# Patient Record
Sex: MALE | Race: WHITE | Employment: UNEMPLOYED | URBAN - METROPOLITAN AREA
[De-identification: names, ages, dates, MRNs, and addresses within clinical notes are randomized per-mention and may not be internally consistent; named-entity substitution may affect disease eponyms.]

---

## 2017-04-30 ENCOUNTER — APPOINTMENT (OUTPATIENT)
Dept: GENERAL RADIOLOGY | Facility: CLINIC | Age: 37
End: 2017-04-30
Attending: EMERGENCY MEDICINE

## 2017-04-30 ENCOUNTER — HOSPITAL ENCOUNTER (EMERGENCY)
Facility: CLINIC | Age: 37
Discharge: HOME | End: 2017-04-30
Attending: EMERGENCY MEDICINE | Admitting: EMERGENCY MEDICINE

## 2017-04-30 VITALS
DIASTOLIC BLOOD PRESSURE: 75 MMHG | WEIGHT: 146 LBS | BODY MASS INDEX: 21.25 KG/M2 | RESPIRATION RATE: 14 BRPM | TEMPERATURE: 97.6 F | OXYGEN SATURATION: 98 % | SYSTOLIC BLOOD PRESSURE: 153 MMHG

## 2017-04-30 DIAGNOSIS — M25.531 RIGHT WRIST PAIN: ICD-10-CM

## 2017-04-30 DIAGNOSIS — S60.211A CONTUSION OF RIGHT WRIST, INITIAL ENCOUNTER: ICD-10-CM
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
## 2017-04-30 PROCEDURE — 73090 X-RAY EXAM OF FOREARM: CPT | Mod: TC,RT

## 2017-04-30 PROCEDURE — 99283 EMERGENCY DEPT VISIT LOW MDM: CPT | Mod: Z6 | Performed by: EMERGENCY MEDICINE

## 2017-04-30 PROCEDURE — 99283 EMERGENCY DEPT VISIT LOW MDM: CPT | Performed by: EMERGENCY MEDICINE

## 2017-04-30 NOTE — ED NOTES
"Here with right arm pain. [place] one fell back and bounced off his arm. His arm has gotten weaker since then. \"I can't even  my five pound dog\"  "

## 2017-04-30 NOTE — DISCHARGE INSTRUCTIONS
Continue rest, ice, ibuprofen and elevate as needed.    Splint for comfort.    Follow-up in clinic if not improving over the next few weeks and return for significant worsening, changes or concerns.    I hope you heal quickly!!

## 2017-04-30 NOTE — ED NOTES
Pt was moving boulders when one rolled back onto him landing on his foot and then his right forearm.  His foot is ok, but his right forearm is painful making it hard to even lift his 5 lbs dog.  /75  Temp 97.6  F (36.4  C)  Resp 14  Wt 66.2 kg (146 lb)  SpO2 98%  BMI 21.25 kg/m2

## 2017-04-30 NOTE — ED AVS SNAPSHOT
[facility] Emergency Department    [address] [place] [address]    Phone:  [phone]    Fax:  [phone]                                       Edinson Cheung   MRN: [id number]    Department:  [facility] Emergency Department   Date of Visit:  4/30/2017           After Visit Summary Signature Page     I have received my discharge instructions, and my questions have been answered. I have discussed any challenges I see with this plan with the nurse or doctor.    ..........................................................................................................................................  Patient/Patient Representative Signature      ..........................................................................................................................................  Patient Representative Print Name and Relationship to Patient    ..................................................               ................................................  Date                                            Time    ..........................................................................................................................................  Reviewed by Signature/Title    ...................................................              ..............................................  Date                                                            Time

## 2017-04-30 NOTE — ED AVS SNAPSHOT
[facility] Emergency Department    [address] [place] [address]    Phone:  [phone]    Fax:  [phone]                                       Edinson Cheung   MRN: [id number]    Department:  [facility] Emergency Department   Date of Visit:  4/30/2017           Patient Information     Date Of Birth          1980        Your diagnoses for this visit were:     Contusion of right wrist, initial encounter     Right wrist pain        You were seen by Candace Conn MD.      Follow-up Information     Follow up with primary provider.    Why:  As needed        Discharge Instructions       Continue rest, ice, ibuprofen and elevate as needed.    Splint for comfort.    Follow-up in clinic if not improving over the next few weeks and return for significant worsening, changes or concerns.    I hope you heal quickly!!    Discharge References/Attachments     SOFT TISSUE CONTUSION (ENGLISH)    BONE BRUISE (BONE CONTUSION), UNDERSTANDING (ENGLISH)      24 Hour Appointment Hotline       To make an appointment at any [facility], call [phone] ([phone]). If you don't have a family doctor or clinic, we will help you find one. [facility] are conveniently located to serve the needs of you and your family.          ED Discharge Orders     SPLINT WRIST FOREARM RT M                    Review of your medicines      Our records show that you are taking the medicines listed below. If these are incorrect, please call your family doctor or clinic.        Dose / Directions Last dose taken    IBUPROFEN   Dose:  800 mg        800 mg   Refills:  0                Procedures and tests performed during your visit     Radius/Ulna XR, PA & LAT, right      Orders Needing Specimen Collection     None      Pending Results     No orders found from 4/28/2017 to 5/1/2017.            Pending Culture Results     No orders found from 4/28/2017 to 5/1/2017.            Thank you for  "choosing [facility]       Thank you for choosing [facility] for your care. Our goal is always to provide you with excellent care. Hearing back from our patients is one way we can continue to improve our services. Please take a few minutes to complete the written survey that you may receive in the mail after you visit with us. Thank you!        [email or web address] Information     [employer] lets you send messages to your doctor, view your test results, renew your prescriptions, schedule appointments and more. To sign up, go to [email or web address] . Click on \"Log in\" on the left side of the screen, which will take you to the Welcome page. Then click on \"Sign up Now\" on the right side of the page.     You will be asked to enter the access code listed below, as well as some personal information. Please follow the directions to create your username and password.     Your access code is: [email or web address]  Expires: 2017  1:56 PM     Your access code will  in 90 days. If you need help or a new code, please call your [facility] clinic or [phone].        Care EveryWhere ID     This is your Care EveryWhere ID. This could be used by other organizations to access your [facility] medical records  [id number]        After Visit Summary       This is your record. Keep this with you and show to your community pharmacist(s) and doctor(s) at your next visit.                  "

## 2017-05-01 NOTE — ED PROVIDER NOTES
History     Chief Complaint   Patient presents with     Arm Injury     The history is provided by the patient and medical records.     This is an otherwise healthy 36-year-old male presenting with right arm pain. Patient was moving some rocks on a [place] 3 days ago. One of the rocks fell back and it bounced off his right forearm. Since that time, he has had swelling, pain, and difficulty lifting and grasping objects despite using ice and ibuprofen. No history of injury to this wrist/arm in the past. No numbness or tingling. He is right-handed. No other injuries or complaints.    I have reviewed the Medications, Allergies, Past Medical and Surgical History, and Social History in the Epic system.    History reviewed. No pertinent past medical history.  History reviewed. No pertinent surgical history.    No current facility-administered medications on file prior to encounter.   Current Outpatient Prescriptions on File Prior to Encounter:  IBUPROFEN 800 mg      No Known Allergies      Review of Systems   All other ROS reviewed and are negative or non-contributory except as stated in HPI.     Physical Exam   BP: 153/75  Heart Rate: 97  Temp: 97.6  F (36.4  C)  Resp: 14  Weight: 66.2 kg (146 lb)  SpO2: 98 %  Physical Exam   Constitutional: He appears well-developed and well-nourished.   Thin, healthy appearing male sitting in the bed   HENT:   Head: Normocephalic and atraumatic.   Nose: Nose normal.   Eyes: Conjunctivae and EOM are normal.   Neck: Normal range of motion.   Cardiovascular: Normal rate, regular rhythm and intact distal pulses.    Pulmonary/Chest: Effort normal.   Musculoskeletal:        Arms:  Neurological: He is alert.   Skin: Skin is warm and dry. He is not diaphoretic.   Psychiatric: He has a normal mood and affect. His behavior is normal.   Vitals reviewed.      ED Course (with Medical Decision Making)    Pt seen and examined by me.  RN and EPIC notes reviewed.      Patient with right forearm  injury. Possible contusion versus fracture. X-ray done.     X-ray is normal. Results discussed with the patient. He was put in a wrist splint for comfort. Continue rest, ice, ibuprofen or Aleve for pain and inflammation. Follow up in clinic if not improving over the next couple of weeks and return for significant worsening, changes or concerns.        Procedures      Assessments & Plan     I have reviewed the findings, diagnosis, plan and need for follow up with the patient.    Discharge Medication List as of 4/30/2017  1:56 PM          Final diagnoses:   Contusion of right wrist, initial encounter   Right wrist pain     Disposition: Patient discharged home in stable condition.  Plan as above. Return for concerns.    Note: Chart documentation done in part with Dragon Voice Recognition software. Although reviewed after completion, some word and grammatical errors may remain.     4/30/2017   [facility] EMERGENCY DEPARTMENT     Candace Conn MD  04/30/17 1196

## 2019-04-05 ENCOUNTER — HOSPITAL ENCOUNTER (EMERGENCY)
Facility: CLINIC | Age: 39
Discharge: HOME | End: 2019-04-05
Attending: FAMILY MEDICINE | Admitting: FAMILY MEDICINE
Payer: COMMERCIAL

## 2019-04-05 VITALS
RESPIRATION RATE: 16 BRPM | OXYGEN SATURATION: 99 % | HEART RATE: 77 BPM | SYSTOLIC BLOOD PRESSURE: 136 MMHG | DIASTOLIC BLOOD PRESSURE: 94 MMHG | TEMPERATURE: 97 F

## 2019-04-05 DIAGNOSIS — S61.411A LACERATION OF RIGHT HAND WITHOUT FOREIGN BODY, INITIAL ENCOUNTER: ICD-10-CM

## 2019-04-05 PROCEDURE — 25000125 ZZHC RX 250: Performed by: FAMILY MEDICINE

## 2019-04-05 PROCEDURE — 90715 TDAP VACCINE 7 YRS/> IM: CPT | Performed by: FAMILY MEDICINE

## 2019-04-05 PROCEDURE — 99283 EMERGENCY DEPT VISIT LOW MDM: CPT | Mod: 25 | Performed by: FAMILY MEDICINE

## 2019-04-05 PROCEDURE — 25000128 H RX IP 250 OP 636: Performed by: FAMILY MEDICINE

## 2019-04-05 PROCEDURE — 12001 RPR S/N/AX/GEN/TRNK 2.5CM/<: CPT | Mod: RT | Performed by: FAMILY MEDICINE

## 2019-04-05 PROCEDURE — 99284 EMERGENCY DEPT VISIT MOD MDM: CPT | Mod: 25 | Performed by: FAMILY MEDICINE

## 2019-04-05 PROCEDURE — 90471 IMMUNIZATION ADMIN: CPT | Performed by: FAMILY MEDICINE

## 2019-04-05 RX ORDER — LIDOCAINE HYDROCHLORIDE AND EPINEPHRINE 10; 10 MG/ML; UG/ML
1 INJECTION, SOLUTION INFILTRATION; PERINEURAL ONCE
Status: COMPLETED | OUTPATIENT
Start: 2019-04-05 | End: 2019-04-05

## 2019-04-05 RX ADMIN — LIDOCAINE HYDROCHLORIDE,EPINEPHRINE BITARTRATE 1 ML: 10; .01 INJECTION, SOLUTION INFILTRATION; PERINEURAL at 09:00

## 2019-04-05 RX ADMIN — CLOSTRIDIUM TETANI TOXOID ANTIGEN (FORMALDEHYDE INACTIVATED), CORYNEBACTERIUM DIPHTHERIAE TOXOID ANTIGEN (FORMALDEHYDE INACTIVATED), BORDETELLA PERTUSSIS TOXOID ANTIGEN (GLUTARALDEHYDE INACTIVATED), BORDETELLA PERTUSSIS FILAMENTOUS HEMAGGLUTININ ANTIGEN (FORMALDEHYDE INACTIVATED), BORDETELLA PERTUSSIS PERTACTIN ANTIGEN, AND BORDETELLA PERTUSSIS FIMBRIAE 2/3 ANTIGEN 0.5 ML: 5; 2; 2.5; 5; 3; 5 INJECTION, SUSPENSION INTRAMUSCULAR at 09:00

## 2019-04-05 NOTE — ED PROVIDER NOTES
History     Chief Complaint   Patient presents with     Laceration     HPI  Edinson Cheung is a 38 year old male who resents with a laceration to his right hand.  Patient was trying to use a wrench when his hand slipped and hit some type of bolt.  This caused a laceration onto the dorsal surface of his hand.  Patient has normal range of motion.  Bleeding is controlled.  Tetanus was last given in 2011.    Allergies:  No Known Allergies    Problem List:    There are no active problems to display for this patient.       Past Medical History:    No past medical history on file.    Past Surgical History:    No past surgical history on file.    Family History:    No family history on file.    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        Medications:      No current outpatient medications on file.      Review of Systems   All other systems reviewed and are negative.      Physical Exam   BP: (!) 136/94  Pulse: 77  Temp: 97  F (36.1  C)  Resp: 16  SpO2: 99 %      Physical Exam   Musculoskeletal:        Right hand: He exhibits tenderness and laceration. He exhibits normal range of motion, no bony tenderness, normal capillary refill, no deformity and no swelling. Normal sensation noted. Normal strength noted.   Nursing note reviewed.      ED Course        Laceration repair  Date/Time: 4/5/2019 9:00 AM  Performed by: Junior Ackerman MD  Authorized by: Junior Ackerman MD   Consent: Verbal consent obtained.  Body area: upper extremity  Location details: right hand  Laceration length: 2 cm  Tendon involvement: none  Nerve involvement: none  Vascular damage: no  Anesthesia: local infiltration    Anesthesia:  Local  Anesthetic: lidocaine 1% with epinephrine  Preparation: Patient was prepped and draped in the usual sterile fashion.  Irrigation solution: saline  Amount of cleaning: standard  Skin closure: 4-0 nylon  Number of sutures: 3  Technique: simple  Approximation: loose  Approximation difficulty: simple  Dressing: 4x4 sterile gauze  Patient tolerance: Patient tolerated the procedure well with no immediate complications                No results found for this or any previous visit (from the past 24 hour(s)).    Medications   Tdap (tetanus-diphtheria-acell pertussis) (ADACEL) injection 0.5 mL (not administered)   lidocaine 1% with EPINEPHrine 1:100,000 injection 1 mL (not administered)     I did update the tetanus as this was a dirty wound and his last tetanus was more than 5 years ago.  Wound was sutured as above.  Sutures need to be removed in 7-10 days.    Assessments & Plan (with Medical Decision Making)  Right hand laceration     I have reviewed the nursing notes.    I have reviewed the findings, diagnosis, plan and need for follow up with the patient.              4/5/2019   [facility] EMERGENCY DEPARTMENT     Junior Ackerman MD  04/05/19 0901

## 2019-04-05 NOTE — ED TRIAGE NOTES
Presents with complaints of laceration to R) hand. States he was scrapping this morning and caught it on a bolt. unknown dt. Clara Castro RN

## 2019-04-05 NOTE — ED AVS SNAPSHOT
[facility] Emergency Department  [address] [place] [address]  Phone:  [phone]  Fax:  [phone]                                    Edinson Cheung   MRN: [id number]    Department:  [facility] Emergency Department   Date of Visit:  4/5/2019           After Visit Summary Signature Page    I have received my discharge instructions, and my questions have been answered. I have discussed any challenges I see with this plan with the nurse or doctor.    ..........................................................................................................................................  Patient/Patient Representative Signature      ..........................................................................................................................................  Patient Representative Print Name and Relationship to Patient    ..................................................               ................................................  Date                                   Time    ..........................................................................................................................................  Reviewed by Signature/Title    ...................................................              ..............................................  Date                                               Time          22EPIC Rev 08/18

## 2019-06-19 ENCOUNTER — OFFICE VISIT (OUTPATIENT)
Dept: FAMILY MEDICINE | Facility: CLINIC | Age: 39
End: 2019-06-19
Payer: COMMERCIAL

## 2019-06-19 VITALS
TEMPERATURE: 98.5 F | HEIGHT: 70 IN | RESPIRATION RATE: 14 BRPM | WEIGHT: 133.8 LBS | HEART RATE: 79 BPM | OXYGEN SATURATION: 99 % | BODY MASS INDEX: 19.15 KG/M2 | DIASTOLIC BLOOD PRESSURE: 68 MMHG | SYSTOLIC BLOOD PRESSURE: 132 MMHG

## 2019-06-19 DIAGNOSIS — D23.30 DERMOID CYST OF FACE: Primary | ICD-10-CM

## 2019-06-19 PROCEDURE — 99213 OFFICE O/P EST LOW 20 MIN: CPT | Performed by: FAMILY MEDICINE

## 2019-06-19 ASSESSMENT — MIFFLIN-ST. JEOR: SCORE: 1524.41

## 2019-06-19 NOTE — PROGRESS NOTES
"Subjective     Edinson Cheung is a 38 year old male who presents to clinic today for the following health issues:    HPI   Concern - Bump under left eye  Onset: x couple months    Description:   Soft, sun make feels tingly    Intensity: severe    Progression of Symptoms:  worsening    Accompanying Signs & Symptoms:  Started out as small cyst then became larger    Previous history of similar problem:     Yes not as large as now    Precipitating factors:   Worsened by: sun, touch, heat    Alleviating factors:  Improved by: nothing    Therapies Tried and outcome: over the counter pain reliever     SUBJECTIVE:  Edinson  is a 38 year old male who presents for: Growing cyst on his left upper cheek just below his eye.  Is been there for a while getting larger.  States he can even put sunglasses on.  Painful if touched or pressed on.    History reviewed. No pertinent past medical history.  History reviewed. No pertinent surgical history.  Social History     Tobacco Use     Smoking status: Current Every Day Smoker     Packs/day: 0.25     Types: Cigarettes     Smokeless tobacco: Never Used   Substance Use Topics     Alcohol use: Yes     Comment: on weekends     No current outpatient medications on file.       REVIEW OF SYSTEMS:   5 point ROS negative except as noted above in HPI, including Gen., Resp, CV, GI &  system review.     OBJECTIVE:  Vitals: /68   Pulse 79   Temp 98.5  F (36.9  C) (Temporal)   Resp 14   Ht 1.78 m (5' 10.08\")   Wt 60.7 kg (133 lb 12.8 oz)   SpO2 99%   BMI 19.16 kg/m    BMI= Body mass index is 19.16 kg/m .  He is alert and oriented.  Very large domed cyst about 4 cm left upper cheek.  Not red or warm.  Fluctuant.  Slightly tender.    ASSESSMENT:  Facial cyst possibly sebaceous cyst or dermoid cyst    PLAN:  Consult with general surgery to review options for excising this.    Addendum 6/27/2019 he is medically cleared for [facility] [place].      Akil Soriano MD  [facility] [place]

## 2019-06-27 ENCOUNTER — TELEPHONE (OUTPATIENT)
Dept: SURGERY | Facility: OTHER | Age: 39
End: 2019-06-27

## 2019-06-27 ENCOUNTER — OFFICE VISIT (OUTPATIENT)
Dept: SURGERY | Facility: CLINIC | Age: 39
End: 2019-06-27
Payer: COMMERCIAL

## 2019-06-27 VITALS
SYSTOLIC BLOOD PRESSURE: 180 MMHG | HEART RATE: 100 BPM | DIASTOLIC BLOOD PRESSURE: 88 MMHG | WEIGHT: 133.5 LBS | OXYGEN SATURATION: 99 % | BODY MASS INDEX: 19.11 KG/M2

## 2019-06-27 DIAGNOSIS — M79.89 MASS OF SOFT TISSUE OF FACE: Primary | ICD-10-CM

## 2019-06-27 PROCEDURE — 99243 OFF/OP CNSLTJ NEW/EST LOW 30: CPT | Performed by: SPECIALIST

## 2019-06-27 NOTE — LETTER
6/27/2019         RE: Edinson Cheung  [address] [address]        Dear Colleague,    Thank you for referring your patient, Edinson Cheung, to the [facility]. Please see a copy of my visit note below.    Consult requested by Dr. Soriano    Reason for consultation left face mass    HPI:   Patient is a 38-year-old white male with a 6-year history of a small skin lesion on his left cheek.  It was stable for many years in the past year so has been rapidly increasing in size to the point he has some discomfort with it and it now obscures his vision.  He denies any fevers chills drainage or facial numbness.  He now presented for evaluation of his left cheek lesion.    No past medical history on file.    No past surgical history on file.    No current outpatient medications on file.     No current facility-administered medications for this visit.       No Known Allergies    Social History     Socioeconomic History     Marital status: Single     Spouse name: Not on file     Number of children: Not on file     Years of education: Not on file     Highest education level: Not on file   Occupational History     Occupation: sealcoater   Social Needs     Financial resource strain: Not on file     Food insecurity:     Worry: Not on file     Inability: Not on file     Transportation needs:     Medical: Not on file     Non-medical: Not on file   Tobacco Use     Smoking status: Current Every Day Smoker     Packs/day: 0.25     Types: Cigarettes     Smokeless tobacco: Never Used   Substance and Sexual Activity     Alcohol use: Yes     Comment: on weekends     Drug use: No     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     Sexual activity: Yes   Lifestyle     Physical activity:     Days per week: Not on file     Minutes per session: Not on file     Stress: Not on file   Relationships     Social connections:     Talks on phone: Not on file     Gets together: Not on file     Attends Taoist service:  Not on file     Active member of club or organization: Not on file     Attends meetings of clubs or organizations: Not on file     Relationship status: Not on file     Intimate partner violence:     Fear of current or ex partner: Not on file     Emotionally abused: Not on file     Physically abused: Not on file     Forced sexual activity: Not on file   Other Topics Concern     Parent/sibling w/ CABG, MI or angioplasty before 65F 55M? Not Asked   Social History Narrative     Not on file     No family history on file.     ROS: 10 point ROS neg other than the symptoms noted above in the HPI.    PE:  B/P: 180/88, T: Data Unavailable, P: 100, R: Data Unavailable  General: well developed, well nourished WM who appears older than his stated age  HEENT: NC/AT, EOMI, (-)icterus, (-)injection, left cheek raised soft 3x4x2 cm subcutaneous mass - mobile, minimally tender.  Neck: Supple, No JVD  Chest: CTA  Heart: S1, S2, (-)m/r/g  Abd: Soft, non tender, non distended,  no masses  Ext; Warm, no edema  Psych: AAOx3  Neuro: No focal deficits    Impression/plan:  This is a 38-year-old gentleman with a left cheek lesion that appears to be a lipoma based on exam.  After discussion with the patient the plan at this time is for an excision under MAC.  The procedure, risks, benefits alternatives were discussed and he agrees to proceed.  He will be scheduled in the near future.    Edinson to follow up with Primary Care provider regarding elevated blood pressure.      Lang Rodriguez MD, FACS    Again, thank you for allowing me to participate in the care of your patient.        Sincerely,        Lang Rodriguez MD

## 2019-06-27 NOTE — PATIENT INSTRUCTIONS
Patient Education     How to Quit Smoking  Smoking is one of the hardest habits to break. About half of all people who have ever smoked have been able to quit. Most people who still smoke want to quit. Here are some of the best ways to stop smoking.    Keep trying  Most smokers make many attempts at quitting before they are successful. It s important not to give up.  Go cold turkey  Most former smokers quit cold turkey (all at once). Trying to cut back gradually doesn't seem to work as well, perhaps because it continues the smoking habit. Also, it is possible to inhale more while smoking fewer cigarettes. This results in the same amount of nicotine in your body.  Get support  Support programs can be a big help, especially for heavy smokers. These groups offer lectures, ways to change behavior, and peer support. Here are some ways to find a support program:    Free national quitline: 800-QUIT-NOW (208-523-8203).    [employer] quit-smoking programs.    American Lung Association: (597.669.9504).    American Cancer Society (023-409-5666).  Support at home is important too. Nonsmokers can offer praise and encouragement. If the smoker in your life finds it hard to quit, encourage them to keep trying.  Over-the-counter medicines  Nicotine replacement therapy may make quitting easier. Certain aids, such as the nicotine patch, gum, and lozenges, are available without a prescription. It is best to use these under a doctor s care, though. The skin patch provides a steady supply of nicotine. Nicotine gum and lozenges give temporary bursts of low levels of nicotine. Both methods reduce the craving for cigarettes. Warning: If you have nausea, vomiting, dizziness, weakness, or a fast heartbeat, stop using these products and see your doctor.  Prescription medicines  After reviewing your smoking patterns and past attempts to quit, your doctor may offer a prescription medicine such as bupropion, varenicline, a nicotine inhaler, or  "nasal spray. Each has advantages and side effects. Your doctor can review these with you.  Health benefits of quitting  The benefits of quitting start right away and keep improving the longer you go without smoking. These benefits occur at any age.  So whether you are 17 or 70, quitting is a good decision. Some of the benefits include:    20 minutes: Blood pressure and pulse return to normal.    8 hours: Oxygen levels return to normal.    2 days: Ability to smell and taste begin to improve as damaged nerves regrow.    2 to 3 weeks: Circulation and lung function improve.    1 to 9 months: Coughing, congestion, and shortness of breath decrease; tiredness decreases.    1 year: Risk of heart attack decreases by half.    5 years: Risk of lung cancer decreases by half; risk of stroke becomes the same as a nonsmoker s.  For more on how to quit smoking, try these online resources:     Smokefree.gov    \"Clearing the Air\" booklet from the National Cancer [place]: smokefree.gov/sites/default/files/pdf/clearing-the-air-accessible.pdf  Date Last Reviewed: 3/1/2017    9010-7715 [employer]. [address] [place] [address]. All rights reserved. This information is not intended as a substitute for professional medical care. Always follow your healthcare professional's instructions.           "

## 2019-06-27 NOTE — PROGRESS NOTES
Consult requested by Dr. Soriano    Reason for consultation left face mass    HPI:   Patient is a 38-year-old white male with a 6-year history of a small skin lesion on his left cheek.  It was stable for many years in the past year so has been rapidly increasing in size to the point he has some discomfort with it and it now obscures his vision.  He denies any fevers chills drainage or facial numbness.  He now presented for evaluation of his left cheek lesion.    No past medical history on file.    No past surgical history on file.    No current outpatient medications on file.     No current facility-administered medications for this visit.       No Known Allergies    Social History     Socioeconomic History     Marital status: Single     Spouse name: Not on file     Number of children: Not on file     Years of education: Not on file     Highest education level: Not on file   Occupational History     Occupation: [employer]   Social Needs     Financial resource strain: Not on file     Food insecurity:     Worry: Not on file     Inability: Not on file     Transportation needs:     Medical: Not on file     Non-medical: Not on file   Tobacco Use     Smoking status: Current Every Day Smoker     Packs/day: 0.25     Types: Cigarettes     Smokeless tobacco: Never Used   Substance and Sexual Activity     Alcohol use: Yes     Comment: on weekends     Drug use: No     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     Sexual activity: Yes   Lifestyle     Physical activity:     Days per week: Not on file     Minutes per session: Not on file     Stress: Not on file   Relationships     Social connections:     Talks on phone: Not on file     Gets together: Not on file     Attends Temple service: Not on file     Active member of club or organization: Not on file     Attends meetings of clubs or organizations: Not on file     Relationship status: Not on file     Intimate partner violence:     Fear of current or ex partner: Not on file      Emotionally abused: Not on file     Physically abused: Not on file     Forced sexual activity: Not on file   Other Topics Concern     Parent/sibling w/ CABG, MI or angioplasty before 65F 55M? Not Asked   Social History Narrative     Not on file     No family history on file.     ROS: 10 point ROS neg other than the symptoms noted above in the HPI.    PE:  B/P: 180/88, T: Data Unavailable, P: 100, R: Data Unavailable  General: well developed, well nourished WM who appears older than his stated age  HEENT: NC/AT, EOMI, (-)icterus, (-)injection, left cheek raised soft 3x4x2 cm subcutaneous mass - mobile, minimally tender.  Neck: Supple, No JVD  Chest: CTA  Heart: S1, S2, (-)m/r/g  Abd: Soft, non tender, non distended,  no masses  Ext; Warm, no edema  Psych: AAOx3  Neuro: No focal deficits    Impression/plan:  This is a 38-year-old gentleman with a left cheek lesion that appears to be a lipoma based on exam.  After discussion with the patient the plan at this time is for an excision under MAC.  The procedure, risks, benefits alternatives were discussed and he agrees to proceed.  He will be scheduled in the near future.    Edinson to follow up with Primary Care provider regarding elevated blood pressure.      Lang Rodriguez MD, FACS

## 2019-06-27 NOTE — TELEPHONE ENCOUNTER
Type of surgery: Excision of left face mass  Location of surgery: [facility]   Date of surgery: 6/28/19  Surgeon: Dr. Rodriguez  Pre-Op Appt Date: 6/19/19  Post-Op Appt Date: 7/15/19   Packet sent out: Surgery packet was given to patient in clinic.   Pre-cert/Authorization completed: NA  Date: 6/27/2019    Renetta Gracia  Surgery Scheduler

## 2019-06-28 ENCOUNTER — ANESTHESIA (OUTPATIENT)
Dept: SURGERY | Facility: CLINIC | Age: 39
End: 2019-06-28
Payer: COMMERCIAL

## 2019-06-28 ENCOUNTER — HOSPITAL ENCOUNTER (OUTPATIENT)
Facility: CLINIC | Age: 39
Discharge: HOME | End: 2019-06-28
Attending: SPECIALIST | Admitting: SPECIALIST
Payer: COMMERCIAL

## 2019-06-28 ENCOUNTER — ANESTHESIA EVENT (OUTPATIENT)
Dept: SURGERY | Facility: CLINIC | Age: 39
End: 2019-06-28
Payer: COMMERCIAL

## 2019-06-28 VITALS
OXYGEN SATURATION: 96 % | SYSTOLIC BLOOD PRESSURE: 157 MMHG | HEART RATE: 50 BPM | TEMPERATURE: 97.5 F | RESPIRATION RATE: 16 BRPM | DIASTOLIC BLOOD PRESSURE: 100 MMHG

## 2019-06-28 DIAGNOSIS — G89.18 POST-OP PAIN: Primary | ICD-10-CM

## 2019-06-28 PROCEDURE — 37000008 ZZH ANESTHESIA TECHNICAL FEE, 1ST 30 MIN: Performed by: SPECIALIST

## 2019-06-28 PROCEDURE — 25000128 H RX IP 250 OP 636: Performed by: NURSE ANESTHETIST, CERTIFIED REGISTERED

## 2019-06-28 PROCEDURE — 88304 TISSUE EXAM BY PATHOLOGIST: CPT | Performed by: SPECIALIST

## 2019-06-28 PROCEDURE — 36000050 ZZH SURGERY LEVEL 2 1ST 30 MIN: Performed by: SPECIALIST

## 2019-06-28 PROCEDURE — 88304 TISSUE EXAM BY PATHOLOGIST: CPT | Mod: 26 | Performed by: SPECIALIST

## 2019-06-28 PROCEDURE — 25800030 ZZH RX IP 258 OP 636: Performed by: NURSE ANESTHETIST, CERTIFIED REGISTERED

## 2019-06-28 PROCEDURE — 25000125 ZZHC RX 250: Performed by: SPECIALIST

## 2019-06-28 PROCEDURE — 12051 INTMD RPR FACE/MM 2.5 CM/<: CPT | Mod: 51 | Performed by: SPECIALIST

## 2019-06-28 PROCEDURE — 11443 EXC FACE-MM B9+MARG 2.1-3 CM: CPT | Performed by: SPECIALIST

## 2019-06-28 PROCEDURE — 40000306 ZZH STATISTIC PRE PROC ASSESS II: Performed by: SPECIALIST

## 2019-06-28 PROCEDURE — 71000027 ZZH RECOVERY PHASE 2 EACH 15 MINS: Performed by: SPECIALIST

## 2019-06-28 PROCEDURE — 36000052 ZZH SURGERY LEVEL 2 EA 15 ADDTL MIN: Performed by: SPECIALIST

## 2019-06-28 PROCEDURE — 25000132 ZZH RX MED GY IP 250 OP 250 PS 637: Performed by: SPECIALIST

## 2019-06-28 PROCEDURE — 25000128 H RX IP 250 OP 636: Performed by: SPECIALIST

## 2019-06-28 PROCEDURE — 25000125 ZZHC RX 250: Performed by: NURSE ANESTHETIST, CERTIFIED REGISTERED

## 2019-06-28 PROCEDURE — 27210794 ZZH OR GENERAL SUPPLY STERILE: Performed by: SPECIALIST

## 2019-06-28 PROCEDURE — 37000009 ZZH ANESTHESIA TECHNICAL FEE, EACH ADDTL 15 MIN: Performed by: SPECIALIST

## 2019-06-28 RX ORDER — PROPOFOL 10 MG/ML
INJECTION, EMULSION INTRAVENOUS PRN
Status: DISCONTINUED | OUTPATIENT
Start: 2019-06-28 | End: 2019-06-28

## 2019-06-28 RX ORDER — LIDOCAINE HYDROCHLORIDE 20 MG/ML
INJECTION, SOLUTION INFILTRATION; PERINEURAL PRN
Status: DISCONTINUED | OUTPATIENT
Start: 2019-06-28 | End: 2019-06-28

## 2019-06-28 RX ORDER — ONDANSETRON 2 MG/ML
INJECTION INTRAMUSCULAR; INTRAVENOUS PRN
Status: DISCONTINUED | OUTPATIENT
Start: 2019-06-28 | End: 2019-06-28

## 2019-06-28 RX ORDER — SODIUM CHLORIDE, SODIUM LACTATE, POTASSIUM CHLORIDE, CALCIUM CHLORIDE 600; 310; 30; 20 MG/100ML; MG/100ML; MG/100ML; MG/100ML
INJECTION, SOLUTION INTRAVENOUS CONTINUOUS
Status: DISCONTINUED | OUTPATIENT
Start: 2019-06-28 | End: 2019-06-28 | Stop reason: HOSPADM

## 2019-06-28 RX ORDER — NALOXONE HYDROCHLORIDE 0.4 MG/ML
.1-.4 INJECTION, SOLUTION INTRAMUSCULAR; INTRAVENOUS; SUBCUTANEOUS
Status: DISCONTINUED | OUTPATIENT
Start: 2019-06-28 | End: 2019-06-28 | Stop reason: HOSPADM

## 2019-06-28 RX ORDER — CEFAZOLIN SODIUM 1 G/3ML
1 INJECTION, POWDER, FOR SOLUTION INTRAMUSCULAR; INTRAVENOUS SEE ADMIN INSTRUCTIONS
Status: DISCONTINUED | OUTPATIENT
Start: 2019-06-28 | End: 2019-06-28 | Stop reason: HOSPADM

## 2019-06-28 RX ORDER — HYDROCODONE BITARTRATE AND ACETAMINOPHEN 5; 325 MG/1; MG/1
1-2 TABLET ORAL EVERY 4 HOURS PRN
Qty: 10 TABLET | Refills: 0 | Status: SHIPPED | OUTPATIENT
Start: 2019-06-28 | End: 2020-10-19

## 2019-06-28 RX ORDER — LIDOCAINE HYDROCHLORIDE AND EPINEPHRINE 10; 10 MG/ML; UG/ML
INJECTION, SOLUTION INFILTRATION; PERINEURAL PRN
Status: DISCONTINUED | OUTPATIENT
Start: 2019-06-28 | End: 2019-06-28 | Stop reason: HOSPADM

## 2019-06-28 RX ORDER — HYDROCODONE BITARTRATE AND ACETAMINOPHEN 5; 325 MG/1; MG/1
1 TABLET ORAL
Status: COMPLETED | OUTPATIENT
Start: 2019-06-28 | End: 2019-06-28

## 2019-06-28 RX ORDER — FENTANYL CITRATE 50 UG/ML
25-50 INJECTION, SOLUTION INTRAMUSCULAR; INTRAVENOUS EVERY 10 MIN PRN
Status: DISCONTINUED | OUTPATIENT
Start: 2019-06-28 | End: 2019-06-28 | Stop reason: HOSPADM

## 2019-06-28 RX ORDER — BUPIVACAINE HYDROCHLORIDE AND EPINEPHRINE 2.5; 5 MG/ML; UG/ML
INJECTION, SOLUTION INFILTRATION; PERINEURAL PRN
Status: DISCONTINUED | OUTPATIENT
Start: 2019-06-28 | End: 2019-06-28 | Stop reason: HOSPADM

## 2019-06-28 RX ORDER — MEPERIDINE HYDROCHLORIDE 25 MG/ML
12.5 INJECTION INTRAMUSCULAR; INTRAVENOUS; SUBCUTANEOUS
Status: DISCONTINUED | OUTPATIENT
Start: 2019-06-28 | End: 2019-06-28 | Stop reason: HOSPADM

## 2019-06-28 RX ORDER — PROPOFOL 10 MG/ML
INJECTION, EMULSION INTRAVENOUS CONTINUOUS PRN
Status: DISCONTINUED | OUTPATIENT
Start: 2019-06-28 | End: 2019-06-28

## 2019-06-28 RX ORDER — CEFAZOLIN SODIUM 2 G/100ML
2 INJECTION, SOLUTION INTRAVENOUS
Status: COMPLETED | OUTPATIENT
Start: 2019-06-28 | End: 2019-06-28

## 2019-06-28 RX ORDER — ONDANSETRON 4 MG/1
4 TABLET, ORALLY DISINTEGRATING ORAL EVERY 30 MIN PRN
Status: DISCONTINUED | OUTPATIENT
Start: 2019-06-28 | End: 2019-06-28 | Stop reason: HOSPADM

## 2019-06-28 RX ORDER — FENTANYL CITRATE 50 UG/ML
INJECTION, SOLUTION INTRAMUSCULAR; INTRAVENOUS PRN
Status: DISCONTINUED | OUTPATIENT
Start: 2019-06-28 | End: 2019-06-28

## 2019-06-28 RX ORDER — ONDANSETRON 2 MG/ML
4 INJECTION INTRAMUSCULAR; INTRAVENOUS EVERY 30 MIN PRN
Status: DISCONTINUED | OUTPATIENT
Start: 2019-06-28 | End: 2019-06-28 | Stop reason: HOSPADM

## 2019-06-28 RX ORDER — LIDOCAINE 40 MG/G
CREAM TOPICAL
Status: DISCONTINUED | OUTPATIENT
Start: 2019-06-28 | End: 2019-06-28 | Stop reason: HOSPADM

## 2019-06-28 RX ADMIN — LIDOCAINE HYDROCHLORIDE 5 ML: 10 INJECTION, SOLUTION EPIDURAL; INFILTRATION; INTRACAUDAL; PERINEURAL at 13:27

## 2019-06-28 RX ADMIN — SODIUM CHLORIDE, POTASSIUM CHLORIDE, SODIUM LACTATE AND CALCIUM CHLORIDE: 600; 310; 30; 20 INJECTION, SOLUTION INTRAVENOUS at 13:27

## 2019-06-28 RX ADMIN — ONDANSETRON 4 MG: 2 INJECTION INTRAMUSCULAR; INTRAVENOUS at 14:05

## 2019-06-28 RX ADMIN — FENTANYL CITRATE 50 MCG: 50 INJECTION, SOLUTION INTRAMUSCULAR; INTRAVENOUS at 13:56

## 2019-06-28 RX ADMIN — PROPOFOL 150 MCG/KG/MIN: 10 INJECTION, EMULSION INTRAVENOUS at 13:55

## 2019-06-28 RX ADMIN — PROPOFOL 50 MG: 10 INJECTION, EMULSION INTRAVENOUS at 13:55

## 2019-06-28 RX ADMIN — HYDROCODONE BITARTRATE AND ACETAMINOPHEN 1 TABLET: 5; 325 TABLET ORAL at 14:52

## 2019-06-28 RX ADMIN — LIDOCAINE HYDROCHLORIDE 60 MG: 20 INJECTION, SOLUTION INFILTRATION; PERINEURAL at 13:55

## 2019-06-28 RX ADMIN — MIDAZOLAM 2 MG: 1 INJECTION INTRAMUSCULAR; INTRAVENOUS at 13:50

## 2019-06-28 RX ADMIN — FENTANYL CITRATE 50 MCG: 50 INJECTION, SOLUTION INTRAMUSCULAR; INTRAVENOUS at 13:50

## 2019-06-28 RX ADMIN — CEFAZOLIN SODIUM 2 G: 2 INJECTION, SOLUTION INTRAVENOUS at 13:55

## 2019-06-28 ASSESSMENT — LIFESTYLE VARIABLES: TOBACCO_USE: 1

## 2019-06-28 NOTE — ANESTHESIA CARE TRANSFER NOTE
Patient: Edinson Cheung    Procedure(s):  Excision of left face mass    Diagnosis: left face mass  Diagnosis Additional Information: No value filed.    Anesthesia Type:   MAC     Note:  Airway :Face Mask  Patient transferred to:Phase II  Handoff Report: Identifed the Patient, Identified the Reponsible Provider, Reviewed the pertinent medical history, Discussed the surgical course, Reviewed Intra-OP anesthesia mangement and issues during anesthesia, Set expectations for post-procedure period and Allowed opportunity for questions and acknowledgement of understanding      Vitals: (Last set prior to Anesthesia Care Transfer)    CRNA VITALS  6/28/2019 1402 - 6/28/2019 1440      6/28/2019             Pulse:  60    SpO2:  95 %                Electronically Signed By: ORI Tran CRNA  June 28, 2019  2:40 PM

## 2019-06-28 NOTE — ANESTHESIA PREPROCEDURE EVALUATION
Anesthesia Pre-Procedure Evaluation    Patient: Edinson Cheung   MRN: [id number] : 1980          Preoperative Diagnosis: left face mass    Procedure(s):  Excision of left face mass    No past medical history on file.  No past surgical history on file.    Anesthesia Evaluation     . Pt has had prior anesthetic. Type: General and MAC           ROS/MED HX    ENT/Pulmonary:     (+)tobacco use, Current use 0.5 - 0.75 ppd x 20+ years packs/day  , . .    Neurologic:  - neg neurologic ROS     Cardiovascular:  - neg cardiovascular ROS       METS/Exercise Tolerance:     Hematologic:  - neg hematologic  ROS       Musculoskeletal:  - neg musculoskeletal ROS       GI/Hepatic:  - neg GI/hepatic ROS       Renal/Genitourinary:  - ROS Renal section negative       Endo:  - neg endo ROS       Psychiatric:  - neg psychiatric ROS       Infectious Disease:  - neg infectious disease ROS       Malignancy:      - no malignancy   Other:    - neg other ROS                      Physical Exam  Normal systems: cardiovascular, pulmonary and dental    Airway   Mallampati: II  TM distance: >3 FB  Neck ROM: full    Dental   (+) upper dentures and lower dentures    Cardiovascular   Rhythm and rate: regular and normal      Pulmonary    breath sounds clear to auscultation            No results found for: WBC, HGB, HCT, PLT, CRP, SED, NA, POTASSIUM, CHLORIDE, CO2, BUN, CR, GLC, CLEMENT, PHOS, MAG, ALBUMIN, PROTTOTAL, ALT, AST, GGT, ALKPHOS, BILITOTAL, BILIDIRECT, LIPASE, AMYLASE, ASHLEY, PTT, INR, FIBR, TSH, T4, T3, HCG, HCGS, CKTOTAL, CKMB, TROPN    Preop Vitals  BP Readings from Last 3 Encounters:   19 180/88   19 132/68   19 (!) 136/94    Pulse Readings from Last 3 Encounters:   19 100   19 79   19 77      Resp Readings from Last 3 Encounters:   19 14   19 16   17 14    SpO2 Readings from Last 3 Encounters:   19 99%   19 99%   19 99%      Temp Readings from Last 1  "Encounters:   06/19/19 98.5  F (36.9  C) (Temporal)    Ht Readings from Last 1 Encounters:   06/19/19 1.78 m (5' 10.08\")      Wt Readings from Last 1 Encounters:   06/27/19 60.6 kg (133 lb 8 oz)    Estimated body mass index is 19.11 kg/m  as calculated from the following:    Height as of 6/19/19: 1.78 m (5' 10.08\").    Weight as of 6/27/19: 60.6 kg (133 lb 8 oz).       Anesthesia Plan      History & Physical Review  History and physical reviewed and following examination; no interval change.    ASA Status:  2 .    NPO Status:  > 8 hours    Plan for MAC with Intravenous and Propofol induction. Maintenance will be TIVA.  Reason for MAC:  Deep or markedly invasive procedure (G8) and Procedure to face, neck, head or breast  PONV prophylaxis:  Ondansetron (or other 5HT-3)       Postoperative Care      Consents  Anesthetic plan, risks, benefits and alternatives discussed with:  Patient.  Use of blood products discussed: No .   .                 ORI Tran CRNA  "

## 2019-06-28 NOTE — ANESTHESIA POSTPROCEDURE EVALUATION
Patient: Edinson Cheung    Procedure(s):  Excision of left face mass    Diagnosis:left face mass  Diagnosis Additional Information: No value filed.    Anesthesia Type:  MAC    Note:  Anesthesia Post Evaluation    Patient location during evaluation: Phase 2  Patient participation: Able to fully participate in evaluation  Level of consciousness: awake  Pain management: adequate  Airway patency: patent  Cardiovascular status: acceptable and hemodynamically stable  Respiratory status: acceptable, room air and nonlabored ventilation  Hydration status: stable  PONV: none     Anesthetic complications: None    Comments: Patient was happy with the anesthesia care received and no anesthesia related complications were noted.  I will follow up with the patient again if it is needed.        Last vitals:  Vitals:    06/28/19 1445 06/28/19 1500 06/28/19 1515   BP: 105/62 (!) 141/92 (!) 157/100   Pulse: 52 59 50   Resp: 16 16 16   Temp:      SpO2: 94% 96%          Electronically Signed By: ORI Tran CRNA  June 28, 2019  3:51 PM

## 2019-06-28 NOTE — BRIEF OP NOTE
[facility] Brief Operative Note    Pre-operative diagnosis: left face mass   Post-operative diagnosis Same, sebaceous cyst   Procedure: Procedure(s):  Excision of left face mass - subcutaneous 3x2x2 cm with 3 cm ellipse   Surgeon(s): Surgeon(s) and Role:     * Lang Rodriguez MD - Primary   Estimated blood loss: * No values recorded between 6/28/2019  2:10 PM and 6/28/2019  2:30 PM *    Specimens: ID Type Source Tests Collected by Time Destination   A : Left cheek subcutaneous mass probable subascous cyst with 3 cm elipse Tissue Cheek SURGICAL PATHOLOGY EXAM Lang Rodriguez MD 6/28/2019  2:17 PM       Findings: Sebaceous cyst         #[id number]

## 2019-06-28 NOTE — DISCHARGE INSTRUCTIONS
[facility] Same-Day Surgery   Adult Discharge Orders & Instructions     For 24 hours after surgery    1. Get plenty of rest.  A responsible adult must stay with you for at least 24 hours after you leave the hospital.   2. Do not drive or use heavy equipment.  If you have weakness or tingling, don't drive or use heavy equipment until this feeling goes away.  3. Do not drink alcohol.  4. Avoid strenuous or risky activities.  Ask for help when climbing stairs.   5. You may feel lightheaded.  If so, sit for a few minutes before standing.  Have someone help you get up.   6. You may have a slight fever. Call the doctor if your fever is over 100 F (37.7 C) (taken under the tongue) or lasts longer than 24 hours.  7. You may have a dry mouth, a sore throat, muscle aches or trouble sleeping.  These should go away after 24 hours.  8. Do not make important or legal decisions.  We don t expect you to have any problems from the surgery or treatment you had today. Just in case, here s what to do if you have pain, upset stomach (nausea), bleeding or infection:  Pain:  Take medicines your doctor has prescribed or over-the-counter medicine they have suggested. Resting and using ice packs can help, too. For surgery on an arm or leg, raise it on a pillow to ease swelling. Call your doctor if these methods don t work.  Copyright Rober Morrison, Licensed under [employer] International  Upset stomach (nausea):  Take anti-nausea medicine approved by your doctor. Drink clear liquids like apple juice, ginger ale, broth or 7-Up. Be sure to drink enough fluids. Rest can help, too. Move to normal foods when you re ready. Bleeding:  In the first 24 hours, you may see a little blood on your dressing, about the size of a quarter. You don t need to worry about this much blood, but if the blood spot keeps getting bigger:    Put pressure on the wound if you can, AND    Call your doctor.  Copyright [employer], Licensed under CC4.0  International  Fever/Infection: Please call your doctor if you have any of these signs:    Redness    Swelling    Wound feels warm    Pain gets worse    Bad-smelling fluid leaks from wound    Fever or chills  Call your doctor for any of the followin.  It has been over 8 to 10 hours since surgery and you are still not able to urinate (pass water).    2.  Headache for over 24 hours.       [facility] Nurse advice line: [phone]

## 2019-06-28 NOTE — OP NOTE
Procedure Date: 06/28/2019      PREOPERATIVE DIAGNOSIS:  Left face mass.      POSTOPERATIVE DIAGNOSIS:  Left face mass, probable sebaceous cyst.      PROCEDURE:  Excision of subcutaneous face mass measuring 3 x 2 x 2 cm in size with a 3 cm ellipse.      SURGEON:  Lang Rodriguez MD, [facility]      ANESTHESIA:  MAC.      INDICATIONS FOR PROCEDURE:  This is a 38-year-old gentleman with a 6-year history of a left cheek mass.  Over the past year, it suddenly enlarged in size.  Because of the increasing size, he opted to have it excised.      OPERATIVE FINDINGS:  Included a 3 x 3 x 2 cm sebaceous cyst that was excised with a 3 cm ellipse.      DETAILS OF PROCEDURE:  With cooperation of the patient preoperatively, the mass was marked.  He was taken to the operating room and after receiving some sedation, the left face was prepped and draped in sterile fashion.  A timeout was performed confirming the identity of the patient as well as the procedure to be performed.  The area around the mass was then infiltrated with local anesthetic.  After adequate analgesia was achieved, an incision was then made over the mass.  We then came across a thin, white glistening capsule.  This was then identified as a sebaceous cyst.  We then began to dissect it free with tenotomy scissors.  The punctum was identified.  The skin around the punctum was also then excised.  We dissected all around with tenotomy scissors and submitted as the specimen.  Hemostasis was achieved using cautery.  The entire ellipse size was approximately 3 cm in length.        The area was then closed in multiple layers to achieve a good cosmetic result, the dog ears were taken off the end.  The subcutaneous tissue was reapproximated with 3-0 Vicryl.  The skin was closed using a running 6-0 locking nylon suture.  Sterile dressing was applied.  The patient was then taken from the operating room to the recovery room in stable condition to be sent home.         LANG  MD OLIVERIO, FACS             D: 2019   T: 2019   MT: RANDA      Name:     ELIZA CLEMENT   MRN:      [id number]        Account:        [id number]   :      1980           Procedure Date: 2019      Document: [id number]

## 2019-07-01 ENCOUNTER — NURSE TRIAGE (OUTPATIENT)
Dept: NURSING | Facility: CLINIC | Age: 39
End: 2019-07-01

## 2019-07-01 ENCOUNTER — TELEPHONE (OUTPATIENT)
Dept: SURGERY | Facility: CLINIC | Age: 39
End: 2019-07-01

## 2019-07-01 DIAGNOSIS — G89.18 POST-OP PAIN: Primary | ICD-10-CM

## 2019-07-01 RX ORDER — OXYCODONE AND ACETAMINOPHEN 5; 325 MG/1; MG/1
1-2 TABLET ORAL EVERY 6 HOURS PRN
Qty: 10 TABLET | Refills: 0 | Status: SHIPPED | OUTPATIENT
Start: 2019-07-01 | End: 2019-07-04

## 2019-07-01 NOTE — TELEPHONE ENCOUNTER
"Patient calling today stating that he had a facial mass removed on 6/28/19 and was given an RX for norco. The medication made him very sick to his stomach and \"out of it\".  He has been using ice, tylenol, and ibuprofen and is wondering if there is something else he can use as it is hard to sleep at night from the throbbing pain.  Rating pain anywhere from 7-9/10.    Is something is prescribed that can be electronically sent he uses the [facility] in [place]. If hard copy RX he would pick this up in [place] this afternoon.      Please advise if you will prescribe something different or what alternative OTC measures he can take.     Thank you.     Vanessa DSOUZA RN. . .  7/1/2019, 8:56 AM    "

## 2019-07-01 NOTE — TELEPHONE ENCOUNTER
Spoke to patient - norco making him sick with nausea.  Would prefer percocet.  Took his girlfriends and had no sx.  No signs of infection.  Will give small script.

## 2019-07-01 NOTE — TELEPHONE ENCOUNTER
Surgery on call. Dr Tomás Huggins.paged at 5:58 a.m. By the page .  About patient's pain management.  I advised the patient to call back if they have not heard from the on call HCP within 30 minutes.  Andreia Tellez RN-[facility] [employer] [facility]

## 2019-07-01 NOTE — TELEPHONE ENCOUNTER
Per RK written RX placed at Specialty upper floor check in desk.........................................Mandi Valdez CMA  ([facility])

## 2019-07-01 NOTE — OR NURSING
[facility] Same Day Surgery  Discharge Call Back  Edinson Cheung  1980  MRN: [id number]  Home: [phone] (home)   PCP: No Ref-Primary, Physician    We are calling to see how you're doing since your surgery/procedure with us?   Comments: ok, having trouble sleeping from pain  Clinical Questions  1. Have you had time to look at your discharge instructions? Do you have any questions in regards to the instructions?   Comment: no questions  2. Do you feel your pain is being controlled with the regimen the surgeon sent you home on? (ie: prescription medications, over the counter pain medications, ice packs)   Comments: Norco making him feel sick-called for different med (percocet)  3. Have you noticed any drainage on your dressing? Do you know what to do if you have bleeding as a result of your procedure?   Comments: none/yes  4. Have you had any nausea/vomiting? Do you know how to treat this?   Comment: none(except from Norco)/ yes  5. Have you had any signs/symptoms of infection? (ie: fever, swelling, heat, drainage or redness) Do you know what to do if you have?   Comment: none/yes  6. Do you have a follow up appointment made with your surgeon? Do you have a number to contact them at if you need it?   Comment: yes        You may be randomly selected to fill out a [facility] survey. We would appreciate you taking the time to fill this out. It is important to us if you would answer all of the questions on the survey.

## 2019-07-01 NOTE — TELEPHONE ENCOUNTER
The page  put out a second page at 6:46 a.m. To Dr Huggins with the surgeons, Dr Rodriguez's group.   He advised, if the patient calls back again, connect the patient to the page  to see if they can try his cell phone number and connect that route.  Pain management is in the issue.  Andreia Tellez RN-[facility] Nurse Advisors

## 2019-07-02 LAB — COPATH REPORT: NORMAL

## 2019-07-23 ENCOUNTER — ALLIED HEALTH/NURSE VISIT (OUTPATIENT)
Dept: FAMILY MEDICINE | Facility: CLINIC | Age: 39
End: 2019-07-23
Payer: COMMERCIAL

## 2019-07-23 VITALS — TEMPERATURE: 99.2 F

## 2019-07-23 DIAGNOSIS — Z48.02 ENCOUNTER FOR REMOVAL OF SUTURES: Primary | ICD-10-CM

## 2019-07-23 PROCEDURE — 99207 ZZC NO CHARGE NURSE ONLY: CPT

## 2019-07-23 NOTE — PROGRESS NOTES
Suture removal:     Date sutures applied: 6/28/19         Where (setting) in which they applied:hospital stay    Description:  Type: sutures  Location: left cheek    History:    Cause of laceration: Left cheek mass    Accompanying Signs & Symptoms: (staff: if yes-describe)  Redness: no  Warmth: no  Drainage: no  Still bleeding: no  Fevers: no; today temp of 99.2  There are no signs of infection to the facial incision. It appears to have healed nicely. Discussed the temp with pt who states he has been crying a lot due to his mother's recent diagnosis of cancer and poor prognosis. Recommended pt increase fluids and rest and watch for increase in temp. Call back to clinic if necessary.    Lisa Madrid RN on 7/23/2019 at 2:16 PM

## 2020-10-19 ENCOUNTER — OFFICE VISIT (OUTPATIENT)
Dept: OTOLARYNGOLOGY | Facility: CLINIC | Age: 40
End: 2020-10-19

## 2020-10-19 ENCOUNTER — HOSPITAL ENCOUNTER (EMERGENCY)
Facility: CLINIC | Age: 40
Discharge: HOME | End: 2020-10-19
Attending: EMERGENCY MEDICINE | Admitting: EMERGENCY MEDICINE

## 2020-10-19 VITALS
TEMPERATURE: 97.2 F | HEIGHT: 71 IN | SYSTOLIC BLOOD PRESSURE: 155 MMHG | BODY MASS INDEX: 20.44 KG/M2 | WEIGHT: 146 LBS | DIASTOLIC BLOOD PRESSURE: 87 MMHG

## 2020-10-19 VITALS
HEART RATE: 62 BPM | RESPIRATION RATE: 14 BRPM | TEMPERATURE: 96 F | DIASTOLIC BLOOD PRESSURE: 87 MMHG | SYSTOLIC BLOOD PRESSURE: 155 MMHG | OXYGEN SATURATION: 100 %

## 2020-10-19 DIAGNOSIS — H60.02 ABSCESS OF LEFT PINNA: Primary | ICD-10-CM

## 2020-10-19 DIAGNOSIS — H92.02 LEFT EAR PAIN: ICD-10-CM

## 2020-10-19 PROCEDURE — 99203 OFFICE O/P NEW LOW 30 MIN: CPT | Mod: 25 | Performed by: OTOLARYNGOLOGY

## 2020-10-19 PROCEDURE — 10140 I&D HMTMA SEROMA/FLUID COLLJ: CPT | Performed by: OTOLARYNGOLOGY

## 2020-10-19 PROCEDURE — 99281 EMR DPT VST MAYX REQ PHY/QHP: CPT | Performed by: EMERGENCY MEDICINE

## 2020-10-19 PROCEDURE — 99282 EMERGENCY DEPT VISIT SF MDM: CPT | Performed by: EMERGENCY MEDICINE

## 2020-10-19 ASSESSMENT — MIFFLIN-ST. JEOR: SCORE: 1589.38

## 2020-10-19 NOTE — ED TRIAGE NOTES
Here with pain and swelling left ear lobe.States he noted it 2 1/2 weeks ago. States it hurts worse when he os sleeping and rolls over on it. Patient's airway, breathing, circulation, and disability/mental status (ABCDs) intact/WDL during triage.

## 2020-10-19 NOTE — LETTER
10/19/2020         RE: Edinson Cheung  [address] [address]        Dear Colleague,    Thank you for referring your patient, Edinson Cheung, to the [facility]. Please see a copy of my visit note below.    ENT Consultation    Edinson Cheung who is a 39 year old male seen in consultation at the request of emergency department.      History of Present Illness - Edinson Cheung is a 39 year old male presents with about 2 to    Weeks history of pain involving left ear.  Recently started having issues with discomfort and pain in the left ear and mostly in the area of the pinna.  He denies any ear discharge.  Denies any ear trauma or history of ear infections.  No history of any arthritic processes or chondritis anywhere.  He does not believe his hearing is changed.  Denies any tinnitus or vertigo.  It is quite painful.  He feels pain mostly top of the pinna on the left but also little bit in the tragal area.  He denies history of bruxism.      Body mass index is 20.36 kg/m .        BP Readings from Last 1 Encounters:   10/19/20 (!) 155/87         Edinson IS NOT a smoker/uses chewing tobacco.  Edinson is not ready to quit      Past Medical History - No past medical history on file.    Current Medications - No current outpatient medications on file.    Allergies - No Known Allergies    Social History -   Social History     Socioeconomic History     Marital status: Single     Spouse name: None     Number of children: None     Years of education: None     Highest education level: None   Occupational History     Occupation: sealcoater   Social Needs     Financial resource strain: None     Food insecurity     Worry: None     Inability: None     Transportation needs     Medical: None     Non-medical: None   Tobacco Use     Smoking status: Current Every Day Smoker     Packs/day: 0.25     Types: Cigarettes     Smokeless tobacco: Never Used   Substance and Sexual Activity  "    Alcohol use: Yes     Comment: on weekends     Drug use: No     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     Sexual activity: Yes   Lifestyle     Physical activity     Days per week: None     Minutes per session: None     Stress: None   Relationships     Social connections     Talks on phone: None     Gets together: None     Attends Spiritism service: None     Active member of club or organization: None     Attends meetings of clubs or organizations: None     Relationship status: None     Intimate partner violence     Fear of current or ex partner: None     Emotionally abused: None     Physically abused: None     Forced sexual activity: None   Other Topics Concern     Parent/sibling w/ CABG, MI or angioplasty before 65F 55M? Not Asked   Social History Narrative     None       Family History - No family history on file.    Review of Systems - As per HPI and PMHx, otherwise review of system review of the head and neck negative. Otherwise 10+ review of system is negative    Physical Exam  BP (!) 155/87   Temp 97.2  F (36.2  C) (Temporal)   Ht 1.803 m (5' 11\")   Wt 66.2 kg (146 lb)   BMI 20.36 kg/m    BMI: Body mass index is 20.36 kg/m .    General - The patient is well nourished and well developed, and appears to have good nutritional status.  Alert and oriented to person and place, answers questions and cooperates with examination appropriately.    SKIN - No suspicious lesions or rashes.  Respiration - No respiratory distress.  Head and Face - Normocephalic and atraumatic, with no gross asymmetry noted of the contour of the facial features.  The facial nerve is intact, with strong symmetric movements.    Voice and Breathing - The patient was breathing comfortably without the use of accessory muscles. The patients voice was clear and strong, and had appropriate pitch and quality.    Ears - Bilateral pinna and EACs with fluctuant edema without erythema in the left superior pinna right of the helix. Tympanic " membrane intact with good mobility on pneumatic otoscopy bilaterally. Bony landmarks of the ossicular chain are normal. The tympanic membranes are normal in appearance. No retraction, perforation, or masses.  No fluid or purulence was seen in the external canal or the middle ear.  There is some discomfort on palpation around the tragal area but no TMJ related tenderness no erythema no masses or lesions noted.    Eyes - Extraocular movements intact.  Sclera were not icteric or injected, conjunctiva were pink and moist.    Neck - Normal midline excursion of the laryngotracheal complex during swallowing.  Full range of motion on passive movement.  Palpation of the occipital, submental, submandibular, internal jugular chain, and supraclavicular nodes did not demonstrate any abnormal lymph nodes or masses.  The carotid pulse was palpable bilaterally.  Palpation of the thyroid was soft and smooth, with no nodules or goiter appreciated.  The trachea was mobile and midline.    Neuro - Nonfocal neuro exam is normal, CN 2 through 12 intact, normal gait and muscle tone.      Performed in clinic today:  Incision and drainage of left pinna seroma.  Patient is since risks and benefits of incision and drainage of the process wishes to have them.  After topically prepping the area injected 1% lidocaine with 1-100,000 epinephrine local.  Using #11 blade incision was made.  Using small alligator forceps I explained that the area and was able to feel intact cartilage beneath.  All that  came out was large amount of completely straw-colored serous fluid.  Compression dressing was made out of a large Band-Aid.  Patient tolerated procedure well      A/P - Edinson Cheung is a 39 year old male with seroma of the left pinna no specific cause.  At this point will maintain compression dressing as long as it lasts and would like to reevaluate him again in 1 week.      Evan Leong MD        Again, thank you for allowing me to  participate in the care of your patient.        Sincerely,        Evan Leong MD, MD

## 2020-10-19 NOTE — PROGRESS NOTES
ENT Consultation    Edinson Cheung who is a 39 year old male seen in consultation at the request of emergency department.      History of Present Illness - Edinson Cheung is a 39 year old male presents with about 2 to    Weeks history of pain involving left ear.  Recently started having issues with discomfort and pain in the left ear and mostly in the area of the pinna.  He denies any ear discharge.  Denies any ear trauma or history of ear infections.  No history of any arthritic processes or chondritis anywhere.  He does not believe his hearing is changed.  Denies any tinnitus or vertigo.  It is quite painful.  He feels pain mostly top of the pinna on the left but also little bit in the tragal area.  He denies history of bruxism.      Body mass index is 20.36 kg/m .        BP Readings from Last 1 Encounters:   10/19/20 (!) 155/87         Edinson IS NOT a smoker/uses chewing tobacco.  Edinson is not ready to quit      Past Medical History - No past medical history on file.    Current Medications - No current outpatient medications on file.    Allergies - No Known Allergies    Social History -   Social History     Socioeconomic History     Marital status: Single     Spouse name: None     Number of children: None     Years of education: None     Highest education level: None   Occupational History     Occupation: sealcoater   Social Needs     Financial resource strain: None     Food insecurity     Worry: None     Inability: None     Transportation needs     Medical: None     Non-medical: None   Tobacco Use     Smoking status: Current Every Day Smoker     Packs/day: 0.25     Types: Cigarettes     Smokeless tobacco: Never Used   Substance and Sexual Activity     Alcohol use: Yes     Comment: on weekends     Drug use: No     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     Sexual activity: Yes   Lifestyle     Physical activity     Days per week: None     Minutes per session: None     Stress: None  "  Relationships     Social connections     Talks on phone: None     Gets together: None     Attends Mandaen service: None     Active member of club or organization: None     Attends meetings of clubs or organizations: None     Relationship status: None     Intimate partner violence     Fear of current or ex partner: None     Emotionally abused: None     Physically abused: None     Forced sexual activity: None   Other Topics Concern     Parent/sibling w/ CABG, MI or angioplasty before 65F 55M? Not Asked   Social History Narrative     None       Family History - No family history on file.    Review of Systems - As per HPI and PMHx, otherwise review of system review of the head and neck negative. Otherwise 10+ review of system is negative    Physical Exam  BP (!) 155/87   Temp 97.2  F (36.2  C) (Temporal)   Ht 1.803 m (5' 11\")   Wt 66.2 kg (146 lb)   BMI 20.36 kg/m    BMI: Body mass index is 20.36 kg/m .    General - The patient is well nourished and well developed, and appears to have good nutritional status.  Alert and oriented to person and place, answers questions and cooperates with examination appropriately.    SKIN - No suspicious lesions or rashes.  Respiration - No respiratory distress.  Head and Face - Normocephalic and atraumatic, with no gross asymmetry noted of the contour of the facial features.  The facial nerve is intact, with strong symmetric movements.    Voice and Breathing - The patient was breathing comfortably without the use of accessory muscles. The patients voice was clear and strong, and had appropriate pitch and quality.    Ears - Bilateral pinna and EACs with fluctuant edema without erythema in the left superior pinna right of the helix. Tympanic membrane intact with good mobility on pneumatic otoscopy bilaterally. Bony landmarks of the ossicular chain are normal. The tympanic membranes are normal in appearance. No retraction, perforation, or masses.  No fluid or purulence was seen in " the external canal or the middle ear.  There is some discomfort on palpation around the tragal area but no TMJ related tenderness no erythema no masses or lesions noted.    Eyes - Extraocular movements intact.  Sclera were not icteric or injected, conjunctiva were pink and moist.    Neck - Normal midline excursion of the laryngotracheal complex during swallowing.  Full range of motion on passive movement.  Palpation of the occipital, submental, submandibular, internal jugular chain, and supraclavicular nodes did not demonstrate any abnormal lymph nodes or masses.  The carotid pulse was palpable bilaterally.  Palpation of the thyroid was soft and smooth, with no nodules or goiter appreciated.  The trachea was mobile and midline.    Neuro - Nonfocal neuro exam is normal, CN 2 through 12 intact, normal gait and muscle tone.      Performed in clinic today:  Incision and drainage of left pinna seroma.  Patient is since risks and benefits of incision and drainage of the process wishes to have them.  After topically prepping the area injected 1% lidocaine with 1-100,000 epinephrine local.  Using #11 blade incision was made.  Using small alligator forceps I explained that the area and was able to feel intact cartilage beneath.  All that  came out was large amount of completely straw-colored serous fluid.  Compression dressing was made out of a large Band-Aid.  Patient tolerated procedure well      A/P - Edinson Cheung is a 39 year old male with seroma of the left pinna no specific cause.  At this point will maintain compression dressing as long as it lasts and would like to reevaluate him again in 1 week.      Evan Leong MD

## 2020-10-19 NOTE — ED PROVIDER NOTES
History     Chief Complaint   Patient presents with     Otalgia     HPI  Edinson Cheung is a 39 year old male who presents with a sore swollen left ear.  This is been present for 2 weeks.  He states it is very painful and feels like his heart is throbbing in there.  No history of abscess.  No drainage.  No trauma to the area.  No fever.    Allergies:  No Known Allergies    Problem List:    There are no active problems to display for this patient.       Past Medical History:    History reviewed. No pertinent past medical history.    Past Surgical History:    Past Surgical History:   Procedure Laterality Date     EXCISE MASS FACE Left 6/28/2019    Procedure: Excision of left face mass;  Surgeon: Lang Rodriguez MD;  Location: [facility] OR       Family History:    No family history on file.    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        Medications:    No current outpatient medications on file.        Review of Systems  All other systems are reviewed and are negative    Physical Exam   BP: (!) 155/87  Pulse: 62  Temp: 96  F (35.6  C)  Resp: 14  SpO2: 100 %      Physical Exam  Vitals signs and nursing note reviewed.   Constitutional:       General: He is not in acute distress.     Appearance: He is well-developed. He is not diaphoretic.   HENT:      Head: Normocephalic and atraumatic.      Ears:      Comments: Left earlobe reveals fluid collection and tenderness mid superior lateral aspect.  Eyes:      General: No scleral icterus.        Right eye: No discharge.         Left eye: No discharge.      Conjunctiva/sclera: Conjunctivae normal.   Neck:      Musculoskeletal: Normal range of motion and neck supple.   Cardiovascular:       Rate and Rhythm: Normal rate and regular rhythm.      Heart sounds: Normal heart sounds. No murmur.   Pulmonary:      Effort: Pulmonary effort is normal. No respiratory distress.      Breath sounds: Normal breath sounds. No stridor.   Abdominal:      Palpations: Abdomen is soft.      Tenderness: There is no abdominal tenderness.   Musculoskeletal: Normal range of motion.   Skin:     General: Skin is warm and dry.      Coloration: Skin is not pale.      Findings: No erythema or rash.   Neurological:      Mental Status: He is alert.      Cranial Nerves: No cranial nerve deficit.      Motor: No abnormal muscle tone.         ED Course        Procedures               Critical Care time:  none               No results found for this or any previous visit (from the past 24 hour(s)).    Medications - No data to display    Assessments & Plan (with Medical Decision Making)  39-year-old male with sore left ear in the upper lobe and apparent fluid collection consistent with possible abscess.  Case discussed with ENT, Dr. Leong who has agreed to see the patient in clinic now.     I have reviewed the nursing notes.    I have reviewed the findings, diagnosis, plan and need for follow up with the patient.       New Prescriptions    No medications on file       Final diagnoses:   Left ear pain       10/19/2020   [facility] EMERGENCY DEPT     Chuck Santiago MD  10/19/20 1126

## 2020-10-19 NOTE — ED AVS SNAPSHOT
[facility] Emergency Dept  [address] [place] [address]  Phone: [phone]  Fax: [phone]                                    Edinson Cheung   MRN: [id number]    Department: [facility] Emergency Dept   Date of Visit: 10/19/2020           After Visit Summary Signature Page    I have received my discharge instructions, and my questions have been answered. I have discussed any challenges I see with this plan with the nurse or doctor.    ..........................................................................................................................................  Patient/Patient Representative Signature      ..........................................................................................................................................  Patient Representative Print Name and Relationship to Patient    ..................................................               ................................................  Date                                   Time    ..........................................................................................................................................  Reviewed by Signature/Title    ...................................................              ..............................................  Date                                               Time          22EPIC Rev 08/18

## 2020-11-12 ENCOUNTER — VIRTUAL VISIT (OUTPATIENT)
Dept: FAMILY MEDICINE | Facility: OTHER | Age: 40
End: 2020-11-12

## 2020-11-12 NOTE — PROGRESS NOTES
"Date: 2020 10:11:37  Clinician: Ken Cormier  Clinician NPI: [id number]  Patient: Edinson Cheung  Patient : 1980  Patient Address: [address] [place] [id number]  Patient Phone: [phone]  Visit Protocol: URI  Patient Summary:  Edinson is a 40 year old ( : 1980 ) male who initiated a [facility] Visit for COVID-19 (Coronavirus) evaluation and screening. When asked the question \"Please sign me up to receive news, health information and promotions. \", Edinson responded \"No\".    Edinson states his symptoms started 1-2 days ago.   His symptoms consist of rhinitis, myalgia, chills, malaise, a cough, nasal congestion, and anosmia. He is experiencing mild difficulty breathing with activities but can speak normally in full sentences. Edinson also feels feverish but was unable to measure his temperature.   Symptom details     Nasal secretions: The color of his mucus is clear.    Cough: Edinson coughs a few times an hour and his cough is not more bothersome at night. Phlegm comes into his throat when he coughs. He does not believe his cough is caused by post-nasal drip. The color of the phlegm is yellow.      Edinson denies having vomiting, facial pain or pressure, sore throat, teeth pain, ageusia, diarrhea, ear pain, headache, wheezing, and nausea. He also denies taking antibiotic medication in the past month and having recent facial or sinus surgery in the past 60 days.   Precipitating events  He has not recently been exposed to someone with influenza. Edinson has been in close contact with the following high risk individuals: immunocompromised people.   Pertinent COVID-19 (Coronavirus) information  Edinson does not work or volunteer as healthcare worker or a . In the past 14 days, Edinson has not worked or volunteered at a healthcare facility or group living setting.   In the past 14 days, he also has not lived in a congregate living setting.   " Edinson has had a close contact with a laboratory-confirmed COVID-19 patient within 14 days of symptom onset. He was not exposed at his work. Date Edinson was exposed to the laboratory-confirmed COVID-19 patient: 11/06/2020   Additional information about contact with COVID-19 (Coronavirus) patient as reported by the patient (free text): A friend tested positive and i was woth him    Since December 2019, Edinson has not been tested for COVID-19 and has not had upper respiratory infection or influenza-like illness.   Pertinent medical history  Edinson does not need a return to work/school note.   Weight: 145 lbs   Edinson smokes or uses smokeless tobacco.   Weight: 145 lbs    MEDICATIONS: No current medications, ALLERGIES: NKDA  Clinician Response:  Dear Edinson,   Your symptoms show that you may have coronavirus (COVID-19). This illness can cause fever, cough and trouble breathing. Many people get a mild case and get better on their own. Some people can get very sick.  What should I do?  We would like to test you for this virus.   1. Please call [phone] to schedule your visit. Explain that you were referred by [facility] to have a COVID-19 test. Be ready to share your [facility] visit ID number.  * If you need to schedule in [facility] please call [phone] or for [employer] employees please call [phone].  * If you need to schedule in the [place] area please call [phone]. [employer] employees call [phone].  The following will serve as your written order for this COVID Test, ordered by me, for the indication of suspected COVID [Z20.828]: The test will be ordered in [employer], our electronic health record, after you are scheduled. It will show as ordered and authorized by Hever Lindsay MD.  Order: COVID-19 (Coronavirus) PCR for SYMPTOMATIC testing from [facility].   2. When it's time for your COVID test:  Stay at least 6 feet away from others. (If someone will drive you to your test, stay in the  "backseat, as far away from the  as you can.)   Cover your mouth and nose with a mask, tissue or washcloth.  Go straight to the testing site. Don't make any stops on the way there or back.      3.Starting now: Stay home and away from others (self-isolate) until:   You've had no fever---and no medicine that reduces fever---for one full day (24 hours). And...   Your other symptoms have gotten better. For example, your cough or breathing has improved. And...   At least 10 days have passed since your symptoms started.       During this time, don't leave the house except for testing or medical care.   Stay in your own room, even for meals. Use your own bathroom if you can.   Stay away from others in your home. No hugging, kissing or shaking hands. No visitors.  Don't go to work, school or anywhere else.    Clean \"high touch\" surfaces often (doorknobs, counters, handles, etc.). Use a household cleaning spray or wipes. You'll find a full list of  on the EPA website: www.epa.gov/pesticide-registration/list-n-disinfectants-use-against-sars-cov-2.   Cover your mouth and nose with a mask, tissue or washcloth to avoid spreading germs.  Wash your hands and face often. Use soap and water.  Caregivers in these groups are at risk for severe illness due to COVID-19:  o People 65 years and older  o People who live in a nursing home or long-term care facility  o People with chronic disease (lung, heart, cancer, diabetes, kidney, liver, immunologic)  o People who have a weakened immune system, including those who:   Are in cancer treatment  Take medicine that weakens the immune system, such as corticosteroids  Had a bone marrow or organ transplant  Have an immune deficiency  Have poorly controlled HIV or AIDS  Are obese (body mass index of 40 or higher)  Smoke regularly   o Caregivers should wear gloves while washing dishes, handling laundry and cleaning bedrooms and bathrooms.  o Use caution when washing and drying " laundry: Don't shake dirty laundry, and use the warmest water setting that you can.  o For more tips, go to www.cdc.gov/coronavirus/2019-ncov/downloads/10Things.pdf.       How can I take care of myself?   Get lots of rest. Drink extra fluids (unless a doctor has told you not to).   Take Tylenol (acetaminophen) for fever or pain. If you have liver or kidney problems, ask your family doctor if it's okay to take Tylenol.   Adults can take either:    650 mg (two 325 mg pills) every 4 to 6 hours, or...   1,000 mg (two 500 mg pills) every 8 hours as needed.    Note: Don't take more than 3,000 mg in one day. Acetaminophen is found in many medicines (both prescribed and over-the-counter medicines). Read all labels to be sure you don't take too much.   For children, check the Tylenol bottle for the right dose. The dose is based on the child's age or weight.    If you have other health problems (like cancer, heart failure, an organ transplant or severe kidney disease): Call your specialty clinic if you don't feel better in the next 2 days.       Know when to call 911. Emergency warning signs include:    Trouble breathing or shortness of breath Pain or pressure in the chest that doesn't go away Feeling confused like you haven't felt before, or not being able to wake up Bluish-colored lips or face.  Where can I get more information?   [facility] -- About COVID-19: [email or web address]   CDC -- What to Do If You're Sick: www.cdc.gov/coronavirus/2019-ncov/about/steps-when-sick.html   CDC -- Ending Home Isolation: www.cdc.gov/coronavirus/2019-ncov/hcp/disposition-in-home-patients.html   CDC -- Caring for Someone: www.cdc.gov/coronavirus/2019-ncov/if-you-are-sick/care-for-someone.html   [facility] -- Interim Guidance for Hospital Discharge to Home: www.health.[facility].[place]/diseases/coronavirus/hcp/hospdischarge.pdf   [facility] clinical trials (COVID-19 research studies):  clinicalaffairs.[facility]-clinical-trials    Below are the COVID-19 hotlines at the [place] Department of Health ([facility]). Interpreters are available.    For health questions: Call 107-930-7248 or 1-375.326.4276 (7 a.m. to 7 p.m.) For questions about schools and childcare: Call 673-970-6672 or 1-472.360.6973 (7 a.m. to 7 p.m.)    Diagnosis: Contact with and (suspected) exposure to other viral communicable diseases  Diagnosis ICD: Z20.828

## 2020-11-13 DIAGNOSIS — Z20.822 SUSPECTED COVID-19 VIRUS INFECTION: Primary | ICD-10-CM

## 2020-11-13 PROCEDURE — U0003 INFECTIOUS AGENT DETECTION BY NUCLEIC ACID (DNA OR RNA); SEVERE ACUTE RESPIRATORY SYNDROME CORONAVIRUS 2 (SARS-COV-2) (CORONAVIRUS DISEASE [COVID-19]), AMPLIFIED PROBE TECHNIQUE, MAKING USE OF HIGH THROUGHPUT TECHNOLOGIES AS DESCRIBED BY CMS-2020-01-R: HCPCS | Performed by: FAMILY MEDICINE

## 2020-11-15 LAB
SARS-COV-2 RNA SPEC QL NAA+PROBE: NOT DETECTED
SPECIMEN SOURCE: NORMAL

## 2020-12-07 ENCOUNTER — HOSPITAL ENCOUNTER (EMERGENCY)
Facility: CLINIC | Age: 40
Discharge: HOME | End: 2020-12-07
Attending: FAMILY MEDICINE | Admitting: FAMILY MEDICINE
Payer: OTHER MISCELLANEOUS

## 2020-12-07 VITALS
SYSTOLIC BLOOD PRESSURE: 161 MMHG | OXYGEN SATURATION: 99 % | TEMPERATURE: 97.2 F | RESPIRATION RATE: 18 BRPM | DIASTOLIC BLOOD PRESSURE: 108 MMHG | HEART RATE: 78 BPM | BODY MASS INDEX: 23.01 KG/M2 | WEIGHT: 165 LBS

## 2020-12-07 DIAGNOSIS — S61.012A LACERATION OF LEFT THUMB WITHOUT FOREIGN BODY WITHOUT DAMAGE TO NAIL, INITIAL ENCOUNTER: ICD-10-CM

## 2020-12-07 PROCEDURE — 99283 EMERGENCY DEPT VISIT LOW MDM: CPT | Performed by: FAMILY MEDICINE

## 2020-12-07 PROCEDURE — 12002 RPR S/N/AX/GEN/TRNK2.6-7.5CM: CPT | Performed by: FAMILY MEDICINE

## 2020-12-07 PROCEDURE — 99283 EMERGENCY DEPT VISIT LOW MDM: CPT | Mod: 25 | Performed by: FAMILY MEDICINE

## 2020-12-07 RX ORDER — IBUPROFEN 200 MG
600 TABLET ORAL EVERY 6 HOURS PRN
Refills: 0 | COMMUNITY
Start: 2020-12-07

## 2020-12-07 RX ORDER — ACETAMINOPHEN 500 MG
1000 TABLET ORAL EVERY 6 HOURS PRN
Qty: 100 TABLET | Refills: 11 | COMMUNITY
Start: 2020-12-07

## 2020-12-07 ASSESSMENT — ENCOUNTER SYMPTOMS
WOUND: 1
NUMBNESS: 0
WEAKNESS: 0

## 2020-12-07 NOTE — DISCHARGE INSTRUCTIONS
Keep the wound clean, dry, and covered.  Apply bacitracin or Vaseline to the wound once or twice a day with dressing changes.  The sutures can be removed in clinic in 10-14 days.  Watch for signs of infection.  Tylenol/ibuprofen as needed for discomfort.  Leave the bulky dressing in place for the next 2 days.  You can then stepdown to a lighter dressing/bandaid and splint the thumb to limit movement.  Give the copy of your Report of Work Ability Form to your employer.  Your copy is below.  It was nice visiting with you this morning.  I hope this heals up quickly for you.    Thank you for choosing [facility]. We appreciate the opportunity to meet your urgent medical needs. Please let us know if we could have done anything to make your stay more satisfying.    After discharge, please closely monitor for any new or worsening symptoms. Return to the Emergency Department if you develop any acute worsening signs or symptoms.    If you had lab work, cultures or imaging studies done during your stay, the final results may still be pending. We will call you if your plan of care needs to change. However, if you are not improving as expected, please follow up with your primary care provider or clinic.     Start any prescription medications that were prescribed to you and take them as directed.     Please see additional handouts that may be pertinent to your condition.      REPORT OF WORK ABILITY    PATIENT DATA  Employee Name:   ELIZA CLEMENT       : 1980  #: [id number]      Work related injury: yes  Employer at time of injury: [employer]  Employer contact & phone: N/A  Employed elsewhere? no  Workers' Compensation Carrier/Managed Care Plan:  N/A    Today's date: 2020  Date of injury:2020  Date of first visit: 2020    PROVIDER EVALUATION: Please fill in as needed.  Please give copy to employee for employer.  1. Diagnosis: (S61.012A) Laceration of left thumb without  foreign body without damage to nail, initial encounter  Comment: 2.8 cm  2. Treatment: suture repair  3. Medication: OTC Tylenol/Ibuprofen  NOTE: When ordering a medication, [place] Rules require Work Comp or WC on prescriptions.  4.Return to work date: 12/7/2020   Restrictions:  Edinson Cheung is able to return to work with restrictions:    No use of left thumb  Duration of restrictions:  Until rechecked in clinic.    Maximum Medical Improvement (Date): Deferred. To be determined at follow up visit in clinic.  Any Permanent Partial Disability? Deferred to future exam/consult.    Provider comments: All work comp injuries require a follow up clinic visit for reevaluation and eventual MMI (Maximum Medical Improvement) determination.  It is your responsibility to make your follow up appointment.  Any further work comp forms, disability evaluations, etc, will need to be done through your clinic.    Medical Examiner:  Demetrius Key MD   License or registration: [id number]  [address] [address]  [phone] fax         Next appointment:  10-14 days for suture removal and MMI determination.  To be scheduled by patient.

## 2020-12-07 NOTE — ED AVS SNAPSHOT
[facility] Emergency Dept  [address] [place] [address]  Phone: [phone]  Fax: [phone]                                    Edinson Cheung   MRN: [id number]    Department: [facility] Emergency Dept   Date of Visit: 12/7/2020           After Visit Summary Signature Page    I have received my discharge instructions, and my questions have been answered. I have discussed any challenges I see with this plan with the nurse or doctor.    ..........................................................................................................................................  Patient/Patient Representative Signature      ..........................................................................................................................................  Patient Representative Print Name and Relationship to Patient    ..................................................               ................................................  Date                                   Time    ..........................................................................................................................................  Reviewed by Signature/Title    ...................................................              ..............................................  Date                                               Time          22EPIC Rev 08/18

## 2020-12-17 ENCOUNTER — OFFICE VISIT (OUTPATIENT)
Dept: FAMILY MEDICINE | Facility: CLINIC | Age: 40
End: 2020-12-17
Payer: OTHER MISCELLANEOUS

## 2020-12-17 VITALS
BODY MASS INDEX: 20.58 KG/M2 | HEIGHT: 71 IN | OXYGEN SATURATION: 99 % | DIASTOLIC BLOOD PRESSURE: 78 MMHG | WEIGHT: 147 LBS | TEMPERATURE: 98.9 F | SYSTOLIC BLOOD PRESSURE: 138 MMHG | HEART RATE: 80 BPM | RESPIRATION RATE: 18 BRPM

## 2020-12-17 DIAGNOSIS — Z48.02 ENCOUNTER FOR REMOVAL OF SUTURES: ICD-10-CM

## 2020-12-17 DIAGNOSIS — S61.012A LACERATION OF LEFT THUMB WITHOUT FOREIGN BODY WITHOUT DAMAGE TO NAIL, INITIAL ENCOUNTER: Primary | ICD-10-CM

## 2020-12-17 PROCEDURE — 99213 OFFICE O/P EST LOW 20 MIN: CPT | Performed by: FAMILY MEDICINE

## 2020-12-17 ASSESSMENT — MIFFLIN-ST. JEOR: SCORE: 1588.92

## 2020-12-17 ASSESSMENT — PAIN SCALES - GENERAL: PAINLEVEL: MODERATE PAIN (5)

## 2020-12-17 NOTE — LETTER
REPORT OF WORK COMP    [address] [address]  [phone]      PATIENT DATA    Employee Name: Edinson Cheung      : 1980     #: [id number]    Work related injury: Yes  Employer at time of injury: [employer]   Employer contact & phone: Janet   Employed elsewhere? No  Workers' Compensation Carrier/Managed Care Plan:      Today's date: 2020  Date of injury: 2020  Date of first visit: 2020    PROVIDER EVALUATION: Please fill in as needed.  Please give copy to employee for employer.    1. Diagnosis: Left thumb laceration     2. Treatment: sutures .  3. Medication:   NOTE: When ordering a medication, [place] Rules require [place] or WC on prescriptions.    4. Return to work date: 2020    ** WITH RESTRICTIONS? No restrictions.        RESTRICTIONS: Unlimited unless listed.  Restrictions apply to home and leisure also.  If work restrictions is not available, the employee is totally disabled.    Maximum Medical Improvement (Date): TBD  Any Permanent Partial Disability? Deferred to future exam/consult.    Provider comments: Still not fully healed, but able to work.  Unlikely to have long term issues, but patient instructed to follow-up if concerns after 4-6 weeks from now.    Medical Examiner: Dr. Troy Pérez MD           License or registration: [id number]    Next appointment: 1 month    CC: Employer, Managed Care Plan/Payor, Patient

## 2020-12-17 NOTE — PROGRESS NOTES
"Subjective     Edinson Cheung is a 40 year old male who presents to clinic today for the following health issues:    HPI         ED/UC Followup:    Facility:  [facility]  Date of visit: 12/7/2020  Reason for visit: Work Comp left thumb injury, laceration   Current Status: healing has bandaid on it.          Here for follow-up for left thumb laceration that occurred from slicing with a sharp blade while at work.  Had sutures placed at our ER.  Having pain with flexion, but able to do his job.  He has been placed on different equipment to accommodate his lack of flexion.      Review of Systems   Constitutional, HEENT, cardiovascular, pulmonary, gi and gu systems are negative, except as otherwise noted.      Objective    /78   Pulse 80   Temp 98.9  F (37.2  C) (Temporal)   Resp 18   Ht 1.803 m (5' 11\")   Wt 66.7 kg (147 lb)   SpO2 99%   BMI 20.50 kg/m    Body mass index is 20.5 kg/m .  Physical Exam  Musculoskeletal:      Comments: Left thumb has sutures place.  No discharge or purulence from wound or surrounding erythema.  Has obvious pain with flexion of IP joint.     Neurological:      Mental Status: He is alert.                    Assessment & Plan     ASSESSMENT/ORDERS:    ICD-10-CM    1. Laceration of left thumb without foreign body without damage to nail, initial encounter  S61.012A    2. Encounter for removal of sutures  Z48.02      PLAN:  1.  Sutures removed.    2.  Evaluation of thumb shows pain with flexion which should resolve with time.  No specific work restrictions except what his pain is limiting him to do.  Functional he will be fine with time.     Tobacco Cessation:   reports that he has been smoking cigarettes. He has been smoking about 0.25 packs per day. He has never used smokeless tobacco.               Return if symptoms worsen or fail to improve.    Troy Pérez MD  [facility]    "

## 2021-01-06 ENCOUNTER — TELEPHONE (OUTPATIENT)
Dept: FAMILY MEDICINE | Facility: CLINIC | Age: 41
End: 2021-01-06

## 2021-01-06 NOTE — TELEPHONE ENCOUNTER
Patient calling in needing a letter to return to work. States the letter that was completed last visit was not sufficient per his work. He needs one to state that he has no work restrictions, then is able to return to work.     email to: [email or web address]    [phone] - ok to leave detailed message

## 2021-01-06 NOTE — LETTER
[address] [address]  [phone]        January 7, 2021    Regarding:  Edinson Andradeuse 1980  [address] [place] [address]              To Whom It May Concern;     Edinson was examined by myself on 12/17/2020. It is my medical opinion that he is clear to return to work without restrictions. Please contact my office with any questions.          Sincerely,        Troy Pérez MD

## 2021-01-08 NOTE — TELEPHONE ENCOUNTER
My only question is if he is still needing work accommodations?  He told me at last office visit that he was still on different equipment because he couldn't use his thumb well enough to be on the usual machine he worked on.    Will have staff notify patient of this issue.  If he states he is fine and has no restrictions, I will sign off on a letter stating he can go back.    If he has any hesitation about a full clearance, it would e advisable for him to follow-up for a recheck in person.    Troy Pérez MD

## 2021-01-08 NOTE — TELEPHONE ENCOUNTER
[employer] has been sent in reply to a [employer] message encounter. Closing encounter to eliminate duplication of effort.  Tejal Reaves CMA

## 2021-01-09 ENCOUNTER — HEALTH MAINTENANCE LETTER (OUTPATIENT)
Age: 41
End: 2021-01-09

## 2021-09-04 ENCOUNTER — APPOINTMENT (OUTPATIENT)
Dept: GENERAL RADIOLOGY | Facility: CLINIC | Age: 41
End: 2021-09-04
Attending: NURSE PRACTITIONER

## 2021-09-04 ENCOUNTER — HOSPITAL ENCOUNTER (EMERGENCY)
Facility: CLINIC | Age: 41
Discharge: HOME | End: 2021-09-04
Attending: NURSE PRACTITIONER | Admitting: NURSE PRACTITIONER

## 2021-09-04 VITALS
RESPIRATION RATE: 18 BRPM | HEART RATE: 68 BPM | SYSTOLIC BLOOD PRESSURE: 187 MMHG | OXYGEN SATURATION: 98 % | BODY MASS INDEX: 17.8 KG/M2 | TEMPERATURE: 97.9 F | DIASTOLIC BLOOD PRESSURE: 112 MMHG | WEIGHT: 127.6 LBS

## 2021-09-04 DIAGNOSIS — S62.639B FRACTURE OF DISTAL PHALANX OF FINGER, OPEN: ICD-10-CM

## 2021-09-04 LAB
ANION GAP SERPL CALCULATED.3IONS-SCNC: 6 MMOL/L (ref 3–14)
BASOPHILS # BLD AUTO: 0.1 10E3/UL (ref 0–0.2)
BASOPHILS NFR BLD AUTO: 1 %
BUN SERPL-MCNC: 20 MG/DL (ref 7–30)
CALCIUM SERPL-MCNC: 8.9 MG/DL (ref 8.5–10.1)
CHLORIDE BLD-SCNC: 105 MMOL/L (ref 94–109)
CO2 SERPL-SCNC: 26 MMOL/L (ref 20–32)
CREAT SERPL-MCNC: 1.24 MG/DL (ref 0.66–1.25)
EOSINOPHIL # BLD AUTO: 0.1 10E3/UL (ref 0–0.7)
EOSINOPHIL NFR BLD AUTO: 1 %
ERYTHROCYTE [DISTWIDTH] IN BLOOD BY AUTOMATED COUNT: 12.8 % (ref 10–15)
GFR SERPL CREATININE-BSD FRML MDRD: 71 ML/MIN/1.73M2
GLUCOSE BLD-MCNC: 184 MG/DL (ref 70–99)
HCT VFR BLD AUTO: 38.9 % (ref 40–53)
HGB BLD-MCNC: 13.9 G/DL (ref 13.3–17.7)
HOLD SPECIMEN: NORMAL
HOLD SPECIMEN: NORMAL
IMM GRANULOCYTES # BLD: 0 10E3/UL
IMM GRANULOCYTES NFR BLD: 0 %
LYMPHOCYTES # BLD AUTO: 2.1 10E3/UL (ref 0.8–5.3)
LYMPHOCYTES NFR BLD AUTO: 22 %
MCH RBC QN AUTO: 32.3 PG (ref 26.5–33)
MCHC RBC AUTO-ENTMCNC: 35.7 G/DL (ref 31.5–36.5)
MCV RBC AUTO: 91 FL (ref 78–100)
MONOCYTES # BLD AUTO: 0.6 10E3/UL (ref 0–1.3)
MONOCYTES NFR BLD AUTO: 6 %
NEUTROPHILS # BLD AUTO: 6.4 10E3/UL (ref 1.6–8.3)
NEUTROPHILS NFR BLD AUTO: 70 %
NRBC # BLD AUTO: 0 10E3/UL
NRBC BLD AUTO-RTO: 0 /100
PLATELET # BLD AUTO: 202 10E3/UL (ref 150–450)
POTASSIUM BLD-SCNC: 3.2 MMOL/L (ref 3.4–5.3)
RBC # BLD AUTO: 4.3 10E6/UL (ref 4.4–5.9)
SARS-COV-2 RNA RESP QL NAA+PROBE: NEGATIVE
SODIUM SERPL-SCNC: 137 MMOL/L (ref 133–144)
WBC # BLD AUTO: 9.3 10E3/UL (ref 4–11)

## 2021-09-04 PROCEDURE — 36415 COLL VENOUS BLD VENIPUNCTURE: CPT | Performed by: NURSE PRACTITIONER

## 2021-09-04 PROCEDURE — 96376 TX/PRO/DX INJ SAME DRUG ADON: CPT

## 2021-09-04 PROCEDURE — 87635 SARS-COV-2 COVID-19 AMP PRB: CPT | Performed by: NURSE PRACTITIONER

## 2021-09-04 PROCEDURE — 96361 HYDRATE IV INFUSION ADD-ON: CPT

## 2021-09-04 PROCEDURE — 80048 BASIC METABOLIC PNL TOTAL CA: CPT | Performed by: NURSE PRACTITIONER

## 2021-09-04 PROCEDURE — 99285 EMERGENCY DEPT VISIT HI MDM: CPT | Mod: 25

## 2021-09-04 PROCEDURE — 250N000011 HC RX IP 250 OP 636: Performed by: FAMILY MEDICINE

## 2021-09-04 PROCEDURE — 85025 COMPLETE CBC W/AUTO DIFF WBC: CPT | Performed by: NURSE PRACTITIONER

## 2021-09-04 PROCEDURE — 96375 TX/PRO/DX INJ NEW DRUG ADDON: CPT

## 2021-09-04 PROCEDURE — 73130 X-RAY EXAM OF HAND: CPT | Mod: LT

## 2021-09-04 PROCEDURE — 90715 TDAP VACCINE 7 YRS/> IM: CPT | Performed by: NURSE PRACTITIONER

## 2021-09-04 PROCEDURE — 250N000011 HC RX IP 250 OP 636: Performed by: NURSE PRACTITIONER

## 2021-09-04 PROCEDURE — C9803 HOPD COVID-19 SPEC COLLECT: HCPCS

## 2021-09-04 PROCEDURE — 99284 EMERGENCY DEPT VISIT MOD MDM: CPT | Mod: 25 | Performed by: NURSE PRACTITIONER

## 2021-09-04 PROCEDURE — 258N000003 HC RX IP 258 OP 636: Performed by: NURSE PRACTITIONER

## 2021-09-04 PROCEDURE — 13132 CMPLX RPR F/C/C/M/N/AX/G/H/F: CPT | Performed by: NURSE PRACTITIONER

## 2021-09-04 PROCEDURE — 96365 THER/PROPH/DIAG IV INF INIT: CPT

## 2021-09-04 PROCEDURE — 250N000013 HC RX MED GY IP 250 OP 250 PS 637: Performed by: NURSE PRACTITIONER

## 2021-09-04 PROCEDURE — 13132 CMPLX RPR F/C/C/M/N/AX/G/H/F: CPT

## 2021-09-04 PROCEDURE — 90471 IMMUNIZATION ADMIN: CPT | Performed by: NURSE PRACTITIONER

## 2021-09-04 PROCEDURE — 250N000011 HC RX IP 250 OP 636: Performed by: EMERGENCY MEDICINE

## 2021-09-04 PROCEDURE — 250N000009 HC RX 250

## 2021-09-04 RX ORDER — OXYCODONE AND ACETAMINOPHEN 5; 325 MG/1; MG/1
1-2 TABLET ORAL EVERY 4 HOURS PRN
Qty: 20 TABLET | Refills: 0 | Status: SHIPPED | OUTPATIENT
Start: 2021-09-04

## 2021-09-04 RX ORDER — HYDROMORPHONE HYDROCHLORIDE 1 MG/ML
0.5 INJECTION, SOLUTION INTRAMUSCULAR; INTRAVENOUS; SUBCUTANEOUS ONCE
Status: COMPLETED | OUTPATIENT
Start: 2021-09-04 | End: 2021-09-04

## 2021-09-04 RX ORDER — OXYCODONE AND ACETAMINOPHEN 5; 325 MG/1; MG/1
2 TABLET ORAL ONCE
Status: COMPLETED | OUTPATIENT
Start: 2021-09-04 | End: 2021-09-04

## 2021-09-04 RX ORDER — KETOROLAC TROMETHAMINE 15 MG/ML
15 INJECTION, SOLUTION INTRAMUSCULAR; INTRAVENOUS ONCE
Status: COMPLETED | OUTPATIENT
Start: 2021-09-04 | End: 2021-09-04

## 2021-09-04 RX ORDER — HYDROMORPHONE HYDROCHLORIDE 1 MG/ML
0.5 INJECTION, SOLUTION INTRAMUSCULAR; INTRAVENOUS; SUBCUTANEOUS
Status: COMPLETED | OUTPATIENT
Start: 2021-09-04 | End: 2021-09-04

## 2021-09-04 RX ORDER — CEFAZOLIN SODIUM 2 G/100ML
2 INJECTION, SOLUTION INTRAVENOUS ONCE
Status: COMPLETED | OUTPATIENT
Start: 2021-09-04 | End: 2021-09-04

## 2021-09-04 RX ORDER — OXYCODONE HYDROCHLORIDE 5 MG/1
5 TABLET ORAL EVERY 6 HOURS PRN
Qty: 6 TABLET | Refills: 0 | Status: SHIPPED | OUTPATIENT
Start: 2021-09-04

## 2021-09-04 RX ORDER — GINSENG 100 MG
CAPSULE ORAL
Status: COMPLETED
Start: 2021-09-04 | End: 2021-09-04

## 2021-09-04 RX ORDER — SODIUM CHLORIDE 9 MG/ML
INJECTION, SOLUTION INTRAVENOUS CONTINUOUS
Status: DISCONTINUED | OUTPATIENT
Start: 2021-09-04 | End: 2021-09-05 | Stop reason: HOSPADM

## 2021-09-04 RX ADMIN — SODIUM CHLORIDE 1000 ML: 9 INJECTION, SOLUTION INTRAVENOUS at 20:13

## 2021-09-04 RX ADMIN — KETOROLAC TROMETHAMINE 15 MG: 15 INJECTION, SOLUTION INTRAMUSCULAR; INTRAVENOUS at 21:47

## 2021-09-04 RX ADMIN — OXYCODONE HYDROCHLORIDE AND ACETAMINOPHEN 2 TABLET: 5; 325 TABLET ORAL at 21:48

## 2021-09-04 RX ADMIN — HYDROMORPHONE HYDROCHLORIDE 0.5 MG: 1 INJECTION, SOLUTION INTRAMUSCULAR; INTRAVENOUS; SUBCUTANEOUS at 20:13

## 2021-09-04 RX ADMIN — CEFAZOLIN SODIUM 2 G: 2 INJECTION, SOLUTION INTRAVENOUS at 20:22

## 2021-09-04 RX ADMIN — CLOSTRIDIUM TETANI TOXOID ANTIGEN (FORMALDEHYDE INACTIVATED), CORYNEBACTERIUM DIPHTHERIAE TOXOID ANTIGEN (FORMALDEHYDE INACTIVATED), BORDETELLA PERTUSSIS TOXOID ANTIGEN (GLUTARALDEHYDE INACTIVATED), BORDETELLA PERTUSSIS FILAMENTOUS HEMAGGLUTININ ANTIGEN (FORMALDEHYDE INACTIVATED), BORDETELLA PERTUSSIS PERTACTIN ANTIGEN, AND BORDETELLA PERTUSSIS FIMBRIAE 2/3 ANTIGEN 0.5 ML: 5; 2; 2.5; 5; 3; 5 INJECTION, SUSPENSION INTRAMUSCULAR at 20:27

## 2021-09-04 RX ADMIN — HYDROMORPHONE HYDROCHLORIDE 0.5 MG: 1 INJECTION, SOLUTION INTRAMUSCULAR; INTRAVENOUS; SUBCUTANEOUS at 20:26

## 2021-09-04 RX ADMIN — HYDROMORPHONE HYDROCHLORIDE 1 MG: 1 INJECTION, SOLUTION INTRAMUSCULAR; INTRAVENOUS; SUBCUTANEOUS at 21:11

## 2021-09-04 RX ADMIN — BACITRACIN: 500 OINTMENT TOPICAL at 21:51

## 2021-09-04 ASSESSMENT — ENCOUNTER SYMPTOMS
FEVER: 0
PSYCHIATRIC NEGATIVE: 1
HEMATOLOGIC/LYMPHATIC NEGATIVE: 1
WOUND: 1
COUGH: 0

## 2021-09-05 RX ORDER — KETOROLAC TROMETHAMINE 10 MG/1
10 TABLET, FILM COATED ORAL EVERY 6 HOURS PRN
Qty: 12 TABLET | Refills: 0 | Status: SHIPPED | OUTPATIENT
Start: 2021-09-05 | End: 2021-09-08

## 2021-09-05 RX ORDER — HYDROMORPHONE HYDROCHLORIDE 2 MG/1
2 TABLET ORAL EVERY 6 HOURS PRN
Qty: 15 TABLET | Refills: 0 | Status: SHIPPED | OUTPATIENT
Start: 2021-09-05

## 2021-09-05 NOTE — DISCHARGE INSTRUCTIONS
High risk for having a nonviable fingertip of both fingers but the right ring finger more so than the middle finger.  Keep hand elevated to help decrease swelling and pain.  Take antibiotic and pain medication as directed.  Call orthopedic surgery first thing tomorrow as her scheduling office may be open.  If closed for the holiday call first thing on Tuesday morning.   If developing fever or uncontrolled pain on current medication return back to the emergency department for evaluation.

## 2021-09-05 NOTE — ED PROVIDER NOTES
History     Chief Complaint   Patient presents with     Hand Injury     HPI  Edinson Cheung is a 40 year old male who presents to the emergency department with a laceration to his left mid finger and ring finger post lawnmower injury.  Patient reports the lawnmower was not shutting off any just reach down to lift up the pushing lawnmower and caught his fingers on the blade.  Does have active bleeding upon arrival and wet pressor dressing applied.  Pain is 10 out of 10.  He does report he has sensation to the tips of the fingers but reports severe burning pain to both.  He is not able to bend at the DIP joint.  Appears patient does have partial amputation of the distal tip of the mid finger and near amputation of the distal tip of the ring finger.  Patient is right-hand dominant    Last tetanus was in 2019 however we will also give him additional booster    Allergies:  No Known Allergies    Problem List:    There are no problems to display for this patient.       Past Medical History:    No past medical history on file.    Past Surgical History:    Past Surgical History:   Procedure Laterality Date     EXCISE MASS FACE Left 6/28/2019    Procedure: Excision of left face mass;  Surgeon: Lang Rodriguez MD;  Location:  OR       Family History:    No family history on file.    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        Medications:    amoxicillin-clavulanate (AUGMENTIN) 875-125 MG tablet  oxyCODONE (ROXICODONE) 5 MG tablet  oxyCODONE-acetaminophen (PERCOCET) 5-325 MG tablet  acetaminophen (TYLENOL) 500 MG tablet  ibuprofen (ADVIL/MOTRIN) 200 MG tablet          Review of Systems   Constitutional: Negative for fever.    Respiratory: Negative for cough.    Skin: Positive for wound.   Allergic/Immunologic: Negative for immunocompromised state.   Hematological: Negative.    Psychiatric/Behavioral: Negative.        Physical Exam   BP: (!) 190/111  Pulse: 93  Temp: 97.9  F (36.6  C)  Resp: 18  Weight: 57.9 kg (127 lb 9.6 oz)  SpO2: 99 %      Physical Exam  Vitals and nursing note reviewed.   Constitutional:       General: He is in acute distress.      Appearance: Normal appearance.   HENT:      Head: Normocephalic and atraumatic.   Skin:     General: Skin is warm.      Comments: See picture- left ring finger attached by thin skin pedicle, reports + sensation 3 sec cap refill near complete amputation; left middle finger +sensory at 2-3 sec cap refill.    Neurological:      Mental Status: He is alert.      Sensory: Sensory deficit present.   Psychiatric:         Mood and Affect: Mood normal.                 ED Course  I discussed with the patient we will proceed with IV Ancef and I will perform a digital block after cleansing his hand.  Risks and benefits of procedure explained in full to the patient.  I discussed with the patient I will then need to approximate the fingertips and hope to sustain viability.  On eval of his ring finger however he has a very high probability of losing the fingertip due to a near complete amputation.  Patient verbalized understanding and amenable to plan of care.    2145: Spoke to hand surgeon on-call with [facility] through [facility], Dr. BRUNO White whom concurs with proximity in the fingertips the fingertips, continue antibiotics, splint the fingers and he would like them to call their scheduling department tomorrow as their scheduling department is open on the weekends to schedule a diligent follow-up with him next week.     I discussed with the patient and his wife calling [facility] first thing tomorrow morning for diligent follow-up with your hand surgeon.  We  will keep him on Augmentin to cross cover for infection.  He is to wear the splint at all times.  We will write for Percocet for pain control.  Instructed on keeping the hand elevated above the heart to decrease swelling and assist in pain.  Patient understands his fingertips may not end up being viable in the long run.  He may need additional procedures in regards to either approximating in the fingers and/or full amputation of the distal fingertips.  I discussed with the patient and his wife if he develops a fever or uncontrolled pain with his current pain management regimen certainly return back to the ED for evaluation and management.        [facility]    -Laceration Repair    Date/Time: 9/4/2021 10:04 PM  Performed by: Mckenzie Nieto APRN CNP  Authorized by: Mckenzie Nieto APRN CNP       ANESTHESIA (see MAR for exact dosages):     Anesthesia method:  Nerve block    Block needle gauge:  25 G    Block anesthetic:  Lidocaine 1% w/o epi    Block technique:  Ring    Block injection procedure:  Anatomic landmarks identified, introduced needle, incremental injection, negative aspiration for blood and anatomic landmarks palpated    Block outcome:  Anesthesia achieved      LACERATION DETAILS     Location: left ring and middle finger.    Length (cm):  4    Depth (mm):  5    REPAIR TYPE:     Repair type:  Complex      EXPLORATION:     Limited defect created (wound extended): no      Hemostasis achieved with:  Direct pressure    Wound exploration: wound explored through full range of motion and entire depth of wound probed and visualized      Wound extent: muscle damage, nerve damage, tendon damage and underlying fracture      Tendon damage location:  Upper extremity    Upper extremity tendon damage location:  Finger flexor and finger extensor    Tendon damage extent:  Complete transection    Tendon repair plan:  Refer for evaluation    Contaminated: yes      TREATMENT:      Area cleansed with:  Soap and water and saline    Amount of cleaning:  Extensive    Irrigation volume:  750    Irrigation method:  Syringe    Foreign body removal: no FB visualized.      Debridement:  None    SKIN REPAIR     Repair method:  Sutures    Suture size:  5-0    Suture material:  Nylon    Suture technique:  Simple interrupted    Number of sutures:  10    APPROXIMATION     Approximation:  Loose    POST-PROCEDURE DETAILS     Dressing:  Antibiotic ointment, non-adherent dressing, bulky dressing, splint for protection and tube gauze      PROCEDURE   Patient Tolerance:  Patient tolerated the procedure well with no immediate complications            Results for orders placed or performed during the hospital encounter of 09/04/21 (from the past 24 hour(s))   CBC with platelets differential    Narrative    The following orders were created for panel order CBC with platelets differential.  Procedure                               Abnormality         Status                     ---------                               -----------         ------                     CBC with platelets and d...[[id number]]  Abnormal            Final result                 Please view results for these tests on the individual orders.   Basic metabolic panel   Result Value Ref Range    Sodium 137 133 - 144 mmol/L    Potassium 3.2 (L) 3.4 - 5.3 mmol/L    Chloride 105 94 - 109 mmol/L    Carbon Dioxide (CO2) 26 20 - 32 mmol/L    Anion Gap 6 3 - 14 mmol/L    Urea Nitrogen 20 7 - 30 mg/dL    Creatinine 1.24 0.66 - 1.25 mg/dL    Calcium 8.9 8.5 - 10.1 mg/dL    Glucose 184 (H) 70 - 99 mg/dL    GFR Estimate 71 >60 mL/min/1.73m2   CBC with platelets and differential   Result Value Ref Range    WBC Count 9.3 4.0 - 11.0 10e3/uL    RBC Count 4.30 (L) 4.40 - 5.90 10e6/uL    Hemoglobin 13.9 13.3 - 17.7 g/dL    Hematocrit 38.9 (L) 40.0 - 53.0 %    MCV 91 78 - 100 fL    MCH 32.3 26.5 - 33.0 pg    MCHC 35.7 31.5 - 36.5 g/dL    RDW 12.8 10.0 - 15.0 %     Platelet Count 202 150 - 450 10e3/uL    % Neutrophils 70 %    % Lymphocytes 22 %    % Monocytes 6 %    % Eosinophils 1 %    % Basophils 1 %    % Immature Granulocytes 0 %    NRBCs per 100 WBC 0 <1 /100    Absolute Neutrophils 6.4 1.6 - 8.3 10e3/uL    Absolute Lymphocytes 2.1 0.8 - 5.3 10e3/uL    Absolute Monocytes 0.6 0.0 - 1.3 10e3/uL    Absolute Eosinophils 0.1 0.0 - 0.7 10e3/uL    Absolute Basophils 0.1 0.0 - 0.2 10e3/uL    Absolute Immature Granulocytes 0.0 <=0.0 10e3/uL    Absolute NRBCs 0.0 10e3/uL   [place] Draw    Narrative    The following orders were created for panel order [place] Draw.  Procedure                               Abnormality         Status                     ---------                               -----------         ------                     Extra Blue Top Tube[[id number]]                              Final result               Extra Red Top Tube[[id number]]                               Final result                 Please view results for these tests on the individual orders.   Extra Blue Top Tube   Result Value Ref Range    Hold Specimen JIC    Extra Red Top Tube   Result Value Ref Range    Hold Specimen JIC    Asymptomatic COVID-19 Virus (Coronavirus) by PCR Nasopharyngeal    Specimen: Nasopharyngeal; Swab   Result Value Ref Range    SARS CoV2 PCR Negative Negative    Narrative    Testing was performed using the kanwal  SARS-CoV-2 & Influenza A/B Assay on the kanwal  Radha  System.  This test should be ordered for the detection of SARS-COV-2 in individuals who meet SARS-CoV-2 clinical and/or epidemiological criteria. Test performance is unknown in asymptomatic patients.  This test is for in vitro diagnostic use under the FDA EUA for laboratories certified under CLIA to perform moderate and/or high complexity testing. This test has not been FDA cleared or approved.  A negative test does not rule out the presence of PCR inhibitors in the specimen or target RNA in concentration below the limit  of detection for the assay. The possibility of a false negative should be considered if the patient's recent exposure or clinical presentation suggests COVID-19.  [facility] are certified under the Clinical Laboratory Improvement Amendments of 1988 (CLIA-88) as qualified to perform moderate and/or high complexity laboratory testing.   XR Hand Port Left G/E 3 Views    Narrative    EXAM: XR HAND PORT LEFT G/E 3 VIEWS  LOCATION: [facility]  DATE/TIME: 9/4/2021 8:44 PM    INDICATION: laceration open fracture, finger pain.  COMPARISON: None.      Impression    IMPRESSION: Acute comminuted moderately displaced fractures of the distal phalanges of the middle and ring fingers.       Medications   0.9% sodium chloride BOLUS (0 mLs Intravenous Stopped 9/4/21 2140)     Followed by   sodium chloride 0.9% infusion (has no administration in time range)   ceFAZolin (ANCEF) intermittent infusion 2 g in 100 mL dextrose PRE-MIX (0 g Intravenous Stopped 9/4/21 2052)   HYDROmorphone (PF) (DILAUDID) injection 0.5 mg (0.5 mg Intravenous Given 9/4/21 2013)   Tdap (tetanus-diphtheria-acell pertussis) (ADACEL) injection 0.5 mL (0.5 mLs Intramuscular Given 9/4/21 2027)   HYDROmorphone (PF) (DILAUDID) injection 0.5 mg (0.5 mg Intravenous Given 9/4/21 2026)   HYDROmorphone (DILAUDID) injection 1 mg (1 mg Intravenous Given 9/4/21 2111)   bacitracin 500 UNIT/GM ointment (  Given 9/4/21 2151)   ketorolac (TORADOL) injection 15 mg (15 mg Intravenous Given 9/4/21 2147)   oxyCODONE-acetaminophen (PERCOCET) 5-325 MG per tablet 2 tablet (2 tablets Oral Given 9/4/21 2148)       Assessments & Plan (with Medical Decision Making)     I have reviewed the nursing notes.    I have reviewed the findings, diagnosis, plan and need for follow up with the patient.      New Prescriptions    AMOXICILLIN-CLAVULANATE (AUGMENTIN) 875-125 MG TABLET    Take 1 tablet by mouth 2 times daily    OXYCODONE (ROXICODONE) 5 MG  TABLET    Take 1 tablet (5 mg) by mouth every 6 hours as needed for severe pain    OXYCODONE-ACETAMINOPHEN (PERCOCET) 5-325 MG TABLET    Take 1-2 tablets by mouth every 4 hours as needed for pain or severe pain       Final diagnoses:   Fracture of distal phalanx of finger, open       9/4/2021   [facility] EMERGENCY DEPT     Mckenzie Nieto APRN CNP  09/04/21 [id number]      Addendum:  9/5/2021  12:16 PM  Patient called reporting the Percocet is not helping his pain.  In such we will change him over to Dilaudid 2 mg p.o. every 6 hours in addition to Toradol 10 mg as he did receive IV dose last night in the ED.  Instructed patient to bring his Percocet prescription back to the pharmacy to exchange for the Dilaudid.    ORI Veronica CNP, Dianne Marie, APRN CNP  09/05/21 [id number]

## 2021-09-05 NOTE — ED TRIAGE NOTES
Pt presents with concerns of injuries to his left second through fourth fingers.  Pt's hand came into contact with the  blade.

## 2021-10-23 ENCOUNTER — HEALTH MAINTENANCE LETTER (OUTPATIENT)
Age: 41
End: 2021-10-23

## 2021-11-26 ENCOUNTER — APPOINTMENT (OUTPATIENT)
Dept: URGENT CARE | Facility: CLINIC | Age: 41
End: 2021-11-26

## 2021-11-29 ENCOUNTER — VIRTUAL VISIT (OUTPATIENT)
Dept: FAMILY MEDICINE | Facility: CLINIC | Age: 41
End: 2021-11-29

## 2021-11-29 DIAGNOSIS — Z72.0 TOBACCO USE: ICD-10-CM

## 2021-11-29 DIAGNOSIS — Z20.822 SUSPECTED 2019 NOVEL CORONAVIRUS INFECTION: Primary | ICD-10-CM

## 2021-11-29 PROCEDURE — 99213 OFFICE O/P EST LOW 20 MIN: CPT | Mod: 95 | Performed by: FAMILY MEDICINE

## 2021-11-29 RX ORDER — ALBUTEROL SULFATE 90 UG/1
2 AEROSOL, METERED RESPIRATORY (INHALATION) EVERY 6 HOURS
Qty: 18 G | Refills: 0 | Status: SHIPPED | OUTPATIENT
Start: 2021-11-29 | End: 2022-05-30

## 2021-11-29 NOTE — PROGRESS NOTES
Jered may is a 41 year old who is being evaluated via a billable video visit.      How would you like to obtain your AVS? MyChart  If the video visit is dropped, the invitation should be resent by: Text to cell phone: [phone]  Will anyone else be joining your video visit? No      Video Start Time: 9:11 AM    Assessment & Plan     Suspected 2019 novel coronavirus infection  Will do testing and recommend quarantine until results return.  Given albuterol in case of need.  Any worsening symptoms, go to the ER or call the office  - Symptomatic COVID-19 Virus (Coronavirus) by PCR Nose  - albuterol (PROAIR HFA/PROVENTIL HFA/VENTOLIN HFA) 108 (90 Base) MCG/ACT inhaler  Dispense: 18 g; Refill: 0    Tobacco use  Recommend cessation      6}     Tobacco Cessation:   reports that he has been smoking cigarettes. He has been smoking about 0.25 packs per day. He has never used smokeless tobacco.  Tobacco Cessation Action Plan: Information offered: Patient not interested at this time        No follow-ups on file.    Heather Sethi MD  [facility]   Jered may is a 41 year old who presents for the following health issues     History of Present Illness       He eats 0-1 servings of fruits and vegetables daily.He consumes 4 sweetened beverage(s) daily.He exercises with enough effort to increase his heart rate 9 or less minutes per day.  He exercises with enough effort to increase his heart rate 3 or less days per week.   He is taking medications regularly.           C/O Chills, headache, loss of taste and smell since Saturday    Concern for COVID-19    About how many days ago did these symptoms start? 3 days  Is this your first visit for this illness? Yes  In the 14 days before your symptoms started, have you had close contact with someone with COVID-19 (Coronavirus)? No  Do you have a fever or chills? Yes, I felt feverish or had chills--hasn't checked temp  Are you having new or  worsening difficulty breathing? No  Do you have new or worsening cough? Yes, it's a dry cough.   Have you had any new or unexplained body aches? YES    Have you experienced any of the following NEW symptoms?    Headache: YES    Sore throat: YES    Loss of taste or smell: YES    Chest pain: No    Diarrhea: YES a little    Rash: No  What treatments have you tried? nyquil  Who do you live with? Girlfriend and her child  Are you, or a household member, a healthcare worker or a ? NO  Do you live in a nursing home, group home, or shelter? YES and trailer park  Do you have a way to get food/medications if quarantined? Yes, I have a friend or family member who can help me. and Yes     Bone achy, feverish, starting of the sore throat and ear ache.  No flu shot or covid shot.              Review of Systems   Constitutional, HEENT, cardiovascular, pulmonary, gi and gu systems are negative, except as otherwise noted.      Objective           Vitals:  No vitals were obtained today due to virtual visit.    Physical Exam   GENERAL: Healthy, alert and no distress  EYES: Eyes grossly normal to inspection.  No discharge or erythema, or obvious scleral/conjunctival abnormalities.  RESP: No audible wheeze, cough, or visible cyanosis.  No visible retractions or increased work of breathing.    SKIN: Visible skin clear. No significant rash, abnormal pigmentation or lesions.  NEURO: Cranial nerves grossly intact.  Mentation and speech appropriate for age.  PSYCH: Mentation appears normal, affect normal/bright, judgement and insight intact, normal speech and appearance well-groomed.                Video-Visit Details    Type of service:  Video Visit    Video End Time:9:16 AM    Originating Location (pt. Location): Home    Distant Location (provider location):  [facility]     Platform used for Video Visit: ezzai - how to arabia

## 2021-12-01 ENCOUNTER — LAB (OUTPATIENT)
Dept: FAMILY MEDICINE | Facility: CLINIC | Age: 41
End: 2021-12-01

## 2021-12-01 DIAGNOSIS — Z20.822 SUSPECTED 2019 NOVEL CORONAVIRUS INFECTION: ICD-10-CM

## 2021-12-01 PROCEDURE — U0005 INFEC AGEN DETEC AMPLI PROBE: HCPCS

## 2021-12-01 PROCEDURE — U0003 INFECTIOUS AGENT DETECTION BY NUCLEIC ACID (DNA OR RNA); SEVERE ACUTE RESPIRATORY SYNDROME CORONAVIRUS 2 (SARS-COV-2) (CORONAVIRUS DISEASE [COVID-19]), AMPLIFIED PROBE TECHNIQUE, MAKING USE OF HIGH THROUGHPUT TECHNOLOGIES AS DESCRIBED BY CMS-2020-01-R: HCPCS

## 2021-12-02 DIAGNOSIS — U07.1 INFECTION DUE TO 2019 NOVEL CORONAVIRUS: Primary | ICD-10-CM

## 2021-12-02 LAB — SARS-COV-2 RNA RESP QL NAA+PROBE: POSITIVE

## 2022-02-12 ENCOUNTER — HEALTH MAINTENANCE LETTER (OUTPATIENT)
Age: 42
End: 2022-02-12

## 2022-05-13 ENCOUNTER — HOSPITAL ENCOUNTER (EMERGENCY)
Facility: CLINIC | Age: 42
Discharge: HOME | End: 2022-05-13
Attending: EMERGENCY MEDICINE | Admitting: EMERGENCY MEDICINE
Payer: MEDICAID

## 2022-05-13 VITALS
HEART RATE: 83 BPM | WEIGHT: 140 LBS | TEMPERATURE: 98.3 F | DIASTOLIC BLOOD PRESSURE: 94 MMHG | HEIGHT: 71 IN | BODY MASS INDEX: 19.6 KG/M2 | RESPIRATION RATE: 17 BRPM | OXYGEN SATURATION: 100 % | SYSTOLIC BLOOD PRESSURE: 150 MMHG

## 2022-05-13 DIAGNOSIS — L02.519 ABSCESS, HAND: ICD-10-CM

## 2022-05-13 PROCEDURE — 87070 CULTURE OTHR SPECIMN AEROBIC: CPT | Performed by: EMERGENCY MEDICINE

## 2022-05-13 PROCEDURE — 99283 EMERGENCY DEPT VISIT LOW MDM: CPT | Performed by: EMERGENCY MEDICINE

## 2022-05-13 PROCEDURE — 99282 EMERGENCY DEPT VISIT SF MDM: CPT | Performed by: EMERGENCY MEDICINE

## 2022-05-13 PROCEDURE — 250N000009 HC RX 250: Performed by: EMERGENCY MEDICINE

## 2022-05-13 RX ORDER — METHYLCELLULOSE 4000CPS 30 %
POWDER (GRAM) MISCELLANEOUS ONCE
Status: DISCONTINUED | OUTPATIENT
Start: 2022-05-13 | End: 2022-05-13 | Stop reason: CLARIF

## 2022-05-13 RX ORDER — CEPHALEXIN 500 MG/1
500 CAPSULE ORAL 4 TIMES DAILY
Qty: 28 CAPSULE | Refills: 0 | Status: SHIPPED | OUTPATIENT
Start: 2022-05-13 | End: 2022-05-20

## 2022-05-13 RX ORDER — OXYCODONE AND ACETAMINOPHEN 5; 325 MG/1; MG/1
1 TABLET ORAL EVERY 6 HOURS PRN
Qty: 12 TABLET | Refills: 0 | Status: SHIPPED | OUTPATIENT
Start: 2022-05-13 | End: 2022-05-16

## 2022-05-13 RX ADMIN — Medication 3 ML: at 14:23

## 2022-05-13 NOTE — DISCHARGE INSTRUCTIONS
Warm soaks 2-3 times a day which will increase the blood flow to promote healing.  Keflex 2 tablets twice a day for 7 days  Ibuprofen or Percocet for pain  It may take a day or 2 for the antibiotics to be effective but if after that point you have increased redness, drainage, or fever return for reassessment.

## 2022-05-13 NOTE — ED PROVIDER NOTES
History     Chief Complaint   Patient presents with     Wound Check     HPI  Edinson Cheung is a 41 year old male who presents with concerns for a painful lesion on the dorsum of his left nondominant hand.  Patient states while ago he cut the area on the thermostat while working on a car.  Since that time he has had some increased swelling, redness and increased pain.  No fever or chills.  Unfortunately about a year ago he injured the tip of his middle and ring finger in a lawnmower accident.  Currently is having ongoing issues and abnormal bony growth so is apparently scheduled to have the tips of both of those fingers amputated in the near future.  No other complaints or concerns today.  Review of record shows tetanus is up-to-date.  Daily smoker.    Allergies:  No Known Allergies    Problem List:    There are no problems to display for this patient.       Past Medical History:    No past medical history on file.    Past Surgical History:    Past Surgical History:   Procedure Laterality Date     EXCISE MASS FACE Left 6/28/2019    Procedure: Excision of left face mass;  Surgeon: Lang Rodriguez MD;  Location:  OR       Family History:    No family history on file.    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        Medications:    cephALEXin (KEFLEX) 500 MG capsule  oxyCODONE-acetaminophen (PERCOCET) 5-325 MG tablet  acetaminophen (TYLENOL) 500 MG tablet  albuterol (PROAIR HFA/PROVENTIL HFA/VENTOLIN HFA) 108 (90 Base) MCG/ACT inhaler  HYDROmorphone (DILAUDID) 2 MG tablet  ibuprofen (ADVIL/MOTRIN) 200 MG tablet  oxyCODONE (ROXICODONE) 5 MG tablet  oxyCODONE-acetaminophen (PERCOCET) 5-325 MG tablet          Review of Systems  "all other systems are reviewed and are negative.    Physical Exam   BP: (!) 150/94  Pulse: 83  Temp: 98.3  F (36.8  C)  Resp: 17  Height: 180.3 cm (5' 11\")  Weight: 63.5 kg (140 lb)  SpO2: 100 %      Physical Exam alert cooperative male in mild to moderate stress.  Examination of his left hand reveals a scabbed lesion that is fluctuant on the dorsum of the hand at the base of the MP.  He is able to flex and extend and does not have any swollen or \"sausage\" fingers.  Slight redness of the hand consistent with lymphangitic spread.  No joint effusion.  Has other scabbed lesions on both hands but do not look secondary infected.  On the index finger on the dorsum he also has a scabbed lesion that does look like a small abscess.    ED Course                 Procedures       Let was applied and then the lesion was unroofed and a culture of the pus was obtained.  Both the lesion on the dorsum of the hand and on the finger were unroofed and pus was expressed.  This is sent.       Critical Care time:  none               No results found for this or any previous visit (from the past 24 hour(s)).    Medications   lido-EPINEPHrine-tetracaine (LET) topical gel GEL (3 mLs Topical Given 5/13/22 1423)       Assessments & Plan (with Medical Decision Making)   Edinson Cheung is a 41 year old male who presents with concerns for a painful lesion on the dorsum of his left nondominant hand.  Patient states while ago he cut the area on the thermostat while working on a car.  Since that time he has had some increased swelling, redness and increased pain.  No fever or chills.  Unfortunately about a year ago he injured the tip of his middle and ring finger in a lawnmower accident.  Currently is having ongoing issues and abnormal bony growth so is apparently scheduled to have the tips of both of those fingers amputated in the near future.  No other complaints or concerns today.  Review of record shows tetanus is up-to-date.  Daily smoker.  " On exam patient did have a lesion on the dorsum of his hand that was opened and drained.  Culture is pending.  Keflex for 7 days.  Ibuprofen or Percocet for pain.  Follow-up if persistent symptoms after completion of antibiotics.  If worsening or new concerning symptoms return to the ER.  I have reviewed the nursing notes.    I have reviewed the findings, diagnosis, plan and need for follow up with the patient.       New Prescriptions    CEPHALEXIN (KEFLEX) 500 MG CAPSULE    Take 1 capsule (500 mg) by mouth 4 times daily for 7 days    OXYCODONE-ACETAMINOPHEN (PERCOCET) 5-325 MG TABLET    Take 1 tablet by mouth every 6 hours as needed for pain       Final diagnoses:   Abscess, hand       5/13/2022   [facility] EMERGENCY DEPT     Florin Nick MD  05/13/22 [id number]

## 2022-05-13 NOTE — ED TRIAGE NOTES
Was working on a car 2 days ago and kept hitting his left hand on the motor while removing parts and now having redness, swelling prain and streaking up left hand from the middle knuckle

## 2022-05-16 LAB
BACTERIA WND CULT: ABNORMAL
BACTERIA WND CULT: ABNORMAL
GRAM STAIN RESULT: ABNORMAL
GRAM STAIN RESULT: ABNORMAL

## 2022-05-25 ENCOUNTER — OFFICE VISIT (OUTPATIENT)
Dept: INTERNAL MEDICINE | Facility: CLINIC | Age: 42
End: 2022-05-25
Payer: MEDICAID

## 2022-05-25 VITALS
DIASTOLIC BLOOD PRESSURE: 84 MMHG | HEART RATE: 96 BPM | TEMPERATURE: 98.4 F | RESPIRATION RATE: 18 BRPM | OXYGEN SATURATION: 100 % | BODY MASS INDEX: 19.88 KG/M2 | WEIGHT: 142 LBS | HEIGHT: 71 IN | SYSTOLIC BLOOD PRESSURE: 128 MMHG

## 2022-05-25 DIAGNOSIS — S69.92XS HAND TRAUMA, LEFT, SEQUELA: ICD-10-CM

## 2022-05-25 DIAGNOSIS — Z01.818 PREOP GENERAL PHYSICAL EXAM: Primary | ICD-10-CM

## 2022-05-25 LAB
ALBUMIN SERPL-MCNC: 3.9 G/DL (ref 3.4–5)
ALP SERPL-CCNC: 73 U/L (ref 40–150)
ALT SERPL W P-5'-P-CCNC: 39 U/L (ref 0–70)
ANION GAP SERPL CALCULATED.3IONS-SCNC: 1 MMOL/L (ref 3–14)
AST SERPL W P-5'-P-CCNC: 31 U/L (ref 0–45)
BILIRUB SERPL-MCNC: 0.3 MG/DL (ref 0.2–1.3)
BUN SERPL-MCNC: 16 MG/DL (ref 7–30)
CALCIUM SERPL-MCNC: 9.3 MG/DL (ref 8.5–10.1)
CHLORIDE BLD-SCNC: 105 MMOL/L (ref 94–109)
CO2 SERPL-SCNC: 32 MMOL/L (ref 20–32)
CREAT SERPL-MCNC: 1.09 MG/DL (ref 0.66–1.25)
ERYTHROCYTE [DISTWIDTH] IN BLOOD BY AUTOMATED COUNT: 13.2 % (ref 10–15)
GFR SERPL CREATININE-BSD FRML MDRD: 86 ML/MIN/1.73M2
GLUCOSE BLD-MCNC: 99 MG/DL (ref 70–99)
HCT VFR BLD AUTO: 44 % (ref 40–53)
HGB BLD-MCNC: 14.7 G/DL (ref 13.3–17.7)
MCH RBC QN AUTO: 31.1 PG (ref 26.5–33)
MCHC RBC AUTO-ENTMCNC: 33.4 G/DL (ref 31.5–36.5)
MCV RBC AUTO: 93 FL (ref 78–100)
PLATELET # BLD AUTO: 285 10E3/UL (ref 150–450)
POTASSIUM BLD-SCNC: 4.2 MMOL/L (ref 3.4–5.3)
PROT SERPL-MCNC: 7.5 G/DL (ref 6.8–8.8)
RBC # BLD AUTO: 4.72 10E6/UL (ref 4.4–5.9)
SODIUM SERPL-SCNC: 138 MMOL/L (ref 133–144)
WBC # BLD AUTO: 8.5 10E3/UL (ref 4–11)

## 2022-05-25 PROCEDURE — 80053 COMPREHEN METABOLIC PANEL: CPT | Performed by: INTERNAL MEDICINE

## 2022-05-25 PROCEDURE — 85027 COMPLETE CBC AUTOMATED: CPT | Performed by: INTERNAL MEDICINE

## 2022-05-25 PROCEDURE — 36415 COLL VENOUS BLD VENIPUNCTURE: CPT | Performed by: INTERNAL MEDICINE

## 2022-05-25 PROCEDURE — 99214 OFFICE O/P EST MOD 30 MIN: CPT | Performed by: INTERNAL MEDICINE

## 2022-05-25 ASSESSMENT — PAIN SCALES - GENERAL: PAINLEVEL: SEVERE PAIN (7)

## 2022-05-25 NOTE — PROGRESS NOTES
[address] [address]  Phone: [phone]  Primary Provider: No Ref-Primary, Physician  Pre-op Performing Provider: LINDY BUCK      PREOPERATIVE EVALUATION:  Today's date: 5/25/2022    Edinson Cheung is a 41 year old male who presents for a preoperative evaluation.    Surgical Information:  Surgery/Procedure: left hand finger tip removal   Surgery Location: [facility]  Surgeon: MASOUD Arias  Surgery Date: 5/31/22  Time of Surgery: TBD  Where patient plans to recover: At home with family  Fax number for surgical facility: [facility]- [phone]    Type of Anesthesia Anticipated: General    Assessment & Plan     The proposed surgical procedure is considered LOW risk.    Preop general physical exam  - CBC with platelets; Future  - Comprehensive metabolic panel (BMP + Alb, Alk Phos, ALT, AST, Total. Bili, TP); Future  - CBC with platelets  - Comprehensive metabolic panel (BMP + Alb, Alk Phos, ALT, AST, Total. Bili, TP)    Hand trauma, left, sequela           Risks and Recommendations:  The patient has the following additional risks and recommendations for perioperative complications:   - No identified additional risk factors other than previously addressed    Medication Instructions:  Patient is on no chronic medications    RECOMMENDATION:  APPROVAL GIVEN to proceed with proposed procedure, without further diagnostic evaluation.    Review of external notes as documented above                   Subjective     HPI related to upcoming procedure: Patient has a history of left hand trauma secondary to hand versus lawnmower.  Attempted reattachment of fingertips appears unsuccessful.  Severe unrelenting pain and loss of function noted.  He is discussed fingertip amputation with his orthopedic provider and is wanting to pursue surgical intervention.    Preop Questions 5/25/2022   1. Have you ever had a heart attack or stroke? No   2. Have you ever  had surgery on your heart or blood vessels, such as a stent placement, a coronary artery bypass, or surgery on an artery in your head, neck, heart, or legs? No   3. Do you have chest pain with activity? No   4. Do you have a history of  heart failure? No   5. Do you currently have a cold, bronchitis or symptoms of other infection? No   6. Do you have a cough, shortness of breath, or wheezing? No   7. Do you or anyone in your family have previous history of blood clots? No   8. Do you or does anyone in your family have a serious bleeding problem such as prolonged bleeding following surgeries or cuts? No   9. Have you ever had problems with anemia or been told to take iron pills? No   10. Have you had any abnormal blood loss such as black, tarry or bloody stools? No   11. Have you ever had a blood transfusion? No   12. Are you willing to have a blood transfusion if it is medically needed before, during, or after your surgery? Yes   13. Have you or any of your relatives ever had problems with anesthesia? No   14. Do you have sleep apnea, excessive snoring or daytime drowsiness? No   15. Do you have any artifical heart valves or other implanted medical devices like a pacemaker, defibrillator, or continuous glucose monitor? No   16. Do you have artificial joints? No   17. Are you allergic to latex? No       Health Care Directive:  Patient does not have a Health Care Directive or Living Will: Discussed advance care planning with patient; however, patient declined at this time.    Preoperative Review of :   reviewed - controlled substances reflected in medication list.      Status of Chronic Conditions:  See problem list for active medical problems.  Problems all longstanding and stable, except as noted/documented.  See ROS for pertinent symptoms related to these conditions.      Review of Systems  CONSTITUTIONAL: NEGATIVE for fever, chills, change in weight  INTEGUMENTARY/SKIN: NEGATIVE for worrisome rashes, moles  or lesions  EYES: NEGATIVE for vision changes or irritation  ENT/MOUTH: NEGATIVE for ear, mouth and throat problems  RESP: NEGATIVE for significant cough or SOB  CV: NEGATIVE for chest pain, palpitations or peripheral edema  GI: NEGATIVE for nausea, abdominal pain, heartburn, or change in bowel habits  : NEGATIVE for frequency, dysuria, or hematuria  MUSCULOSKELETAL:Severe pain in the third and fourth finger of the left hand.  NEURO: NEGATIVE for weakness, dizziness or paresthesias  ENDOCRINE: NEGATIVE for temperature intolerance, skin/hair changes  HEME: NEGATIVE for bleeding problems  PSYCHIATRIC: NEGATIVE for changes in mood or affect    There are no problems to display for this patient.     History reviewed. No pertinent past medical history.  Past Surgical History:   Procedure Laterality Date     EXCISE MASS FACE Left 6/28/2019    Procedure: Excision of left face mass;  Surgeon: Lang Rodriguez MD;  Location:  OR     Current Outpatient Medications   Medication Sig Dispense Refill     acetaminophen (TYLENOL) 500 MG tablet Take 2 tablets (1,000 mg) by mouth every 6 hours as needed for pain or fever 100 tablet 11     albuterol (PROAIR HFA/PROVENTIL HFA/VENTOLIN HFA) 108 (90 Base) MCG/ACT inhaler Inhale 2 puffs into the lungs every 6 hours 18 g 0     ibuprofen (ADVIL/MOTRIN) 200 MG tablet Take 3 tablets (600 mg) by mouth every 6 hours as needed for pain or fever  0     HYDROmorphone (DILAUDID) 2 MG tablet Take 1 tablet (2 mg) by mouth every 6 hours as needed for pain or severe pain (Patient not taking: No sig reported) 15 tablet 0     oxyCODONE (ROXICODONE) 5 MG tablet Take 1 tablet (5 mg) by mouth every 6 hours as needed for severe pain (Patient not taking: No sig reported) 6 tablet 0     oxyCODONE-acetaminophen (PERCOCET) 5-325 MG tablet Take 1-2 tablets by mouth every 4 hours as needed for pain or severe pain (Patient not taking: No sig reported) 20 tablet 0       No Known Allergies     Social History  "    Tobacco Use     Smoking status: Current Every Day Smoker     Packs/day: 0.25     Types: Cigarettes     Smokeless tobacco: Never Used   Substance Use Topics     Alcohol use: Yes     Comment: on weekends     History reviewed. No pertinent family history.  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         Objective     /84 (BP Location: Right arm, Patient Position: Sitting, Cuff Size: Adult Large)   Pulse 96   Temp 98.4  F (36.9  C) (Temporal)   Resp 18   Ht 1.803 m (5' 11\")   Wt 64.4 kg (142 lb)   SpO2 100%   BMI 19.80 kg/m      Physical Exam    GENERAL APPEARANCE: healthy, alert and no distress     EYES: EOMI,  PERRL     HENT: ear canals and TM's normal and nose and mouth without ulcers or lesions     NECK: no adenopathy, no asymmetry, masses, or scars and thyroid normal to palpation     RESP: lungs clear to auscultation - no rales, rhonchi or wheezes     CV: regular rates and rhythm, normal S1 S2, no S3 or S4 and no murmur, click or rub     ABDOMEN:  soft, nontender, no HSM or masses and bowel sounds normal     MS: Evidence of previous trauma of the third and fourth finger of the left hand noted.  No current ischemia or infection present.  Poor healing internally with angulation and palpable bony irregularities noted.     SKIN: no suspicious lesions or rashes     NEURO: Normal strength and tone, sensory exam grossly normal, mentation intact and speech normal     PSYCH: mentation appears normal. and affect normal/bright     LYMPHATICS: No cervical adenopathy    Recent Labs   Lab Test 09/04/21 2010   HGB 13.9         POTASSIUM 3.2*   CR 1.24        Diagnostics:  Recent Results (from the past 24 hour(s))   CBC with platelets    Collection Time: 05/25/22  9:43 AM   Result Value Ref Range    WBC Count 8.5 4.0 - 11.0 10e3/uL    RBC Count 4.72 4.40 - 5.90 10e6/uL    Hemoglobin 14.7 13.3 - 17.7 g/dL    Hematocrit 44.0 40.0 - 53.0 %    MCV 93 78 - 100 fL    MCH 31.1 " 26.5 - 33.0 pg    MCHC 33.4 31.5 - 36.5 g/dL    RDW 13.2 10.0 - 15.0 %    Platelet Count 285 150 - 450 10e3/uL   Comprehensive metabolic panel (BMP + Alb, Alk Phos, ALT, AST, Total. Bili, TP)    Collection Time: 05/25/22  9:43 AM   Result Value Ref Range    Sodium 138 133 - 144 mmol/L    Potassium 4.2 3.4 - 5.3 mmol/L    Chloride 105 94 - 109 mmol/L    Carbon Dioxide (CO2) 32 20 - 32 mmol/L    Anion Gap 1 (L) 3 - 14 mmol/L    Urea Nitrogen 16 7 - 30 mg/dL    Creatinine 1.09 0.66 - 1.25 mg/dL    Calcium 9.3 8.5 - 10.1 mg/dL    Glucose 99 70 - 99 mg/dL    Alkaline Phosphatase 73 40 - 150 U/L    AST 31 0 - 45 U/L    ALT 39 0 - 70 U/L    Protein Total 7.5 6.8 - 8.8 g/dL    Albumin 3.9 3.4 - 5.0 g/dL    Bilirubin Total 0.3 0.2 - 1.3 mg/dL    GFR Estimate 86 >60 mL/min/1.73m2      No EKG required for low risk surgery (cataract, skin procedure, breast biopsy, etc).    Revised Cardiac Risk Index (RCRI):  The patient has the following serious cardiovascular risks for perioperative complications:   - No serious cardiac risks = 0 points     RCRI Interpretation: 0 points: Class I (very low risk - 0.4% complication rate)           Signed Electronically by: Dave Amezquita DO  Copy of this evaluation report is provided to requesting physician.

## 2022-05-25 NOTE — PATIENT INSTRUCTIONS
Preparing for Your Surgery  Getting started  A nurse will call you to review your health history and instructions. They will give you an arrival time based on your scheduled surgery time. Please be ready to share:    Your doctor's clinic name and phone number    Your medical, surgical and anesthesia history    A list of allergies and sensitivities    A list of medicines, including herbal treatments and over-the-counter drugs    Whether the patient has a legal guardian (ask how to send us the papers in advance)  Please tell us if you're pregnant--or if there's any chance you might be pregnant. Some surgeries may injure a fetus (unborn baby), so they require a pregnancy test. Surgeries that are safe for a fetus don't always need a test, and you can choose whether to have one.   If you have a child who's having surgery, please ask for a copy of Preparing for Your Child's Surgery.    Preparing for surgery    Within 30 days of surgery: Have a pre-op exam (sometimes called an H&P, or History and Physical). This can be done at a clinic or pre-operative center.  ? If you're having a , you may not need this exam. Talk to your care team.    At your pre-op exam, talk to your care team about all medicines you take. If you need to stop any medicines before surgery, ask when to start taking them again.  ? We do this for your safety. Many medicines can make you bleed too much during surgery. Some change how well surgery (anesthesia) drugs work.    Call your insurance company to let them know you're having surgery. (If you don't have insurance, call [phone].)    Call your clinic if there's any change in your health. This includes signs of a cold or flu (sore throat, runny nose, cough, rash, fever). It also includes a scrape or scratch near the surgery site.    If you have questions on the day of surgery, call your hospital or surgery center.  COVID testing  You may need to be tested for COVID-19 before having  surgery. If so, we will give you instructions.  Eating and drinking guidelines  For your safety: Unless your surgeon tells you otherwise, follow the guidelines below.    Eat and drink as usual until 8 hours before surgery. After that, no food or milk.    Drink clear liquids until 2 hours before surgery. These are liquids you can see through, like water, Gatorade and Propel Water. You may also have black coffee and tea (no cream or milk).    Nothing by mouth within 2 hours of surgery. This includes gum, candy and breath mints.    If you drink alcohol: Stop drinking it the night before surgery.    If your care team tells you to take medicine on the morning of surgery, it's okay to take it with a sip of water.  Preventing infection    Shower or bathe the night before and morning of your surgery. Follow the instructions your clinic gave you. (If no instructions, use regular soap.)    Don't shave or clip hair near your surgery site. We'll remove the hair if needed.    Don't smoke or vape the morning of surgery. You may chew nicotine gum up to 2 hours before surgery. A nicotine patch is okay.  ? Note: Some surgeries require you to completely quit smoking and nicotine. Check with your surgeon.    Your care team will make every effort to keep you safe from infection. We will:  ? Clean our hands often with soap and water (or an alcohol-based hand rub).  ? Clean the skin at your surgery site with a special soap that kills germs.  ? Give you a special gown to keep you warm. (Cold raises the risk of infection.)  ? Wear special hair covers, masks, gowns and gloves during surgery.  ? Give antibiotic medicine, if prescribed. Not all surgeries need antibiotics.  What to bring on the day of surgery    Photo ID and insurance card    Copy of your health care directive, if you have one    Glasses and hearing aides (bring cases)  ? You can't wear contacts during surgery    Inhaler and eye drops, if you use them (tell us about these when  you arrive)    CPAP machine or breathing device, if you use them    A few personal items, if spending the night    If you have . . .  ? A pacemaker, ICD (cardiac defibrillator) or other implant: Bring the ID card.  ? An implanted stimulator: Bring the remote control.  ? A legal guardian: Bring a copy of the certified (court-stamped) guardianship papers.  Please remove any jewelry, including body piercings. Leave jewelry and other valuables at home.  If you're going home the day of surgery    You must have a responsible adult drive you home. They should stay with you overnight as well.    If you don't have someone to stay with you, and you aren't safe to go home alone, we may keep you overnight. Insurance often won't pay for this.  After surgery  If it's hard to control your pain or you need more pain medicine, please call your surgeon's office.  Questions?   If you have any questions for your care team, list them here: _________________________________________________________________________________________________________________________________________________________________________ ____________________________________ ____________________________________ ____________________________________  For informational purposes only. Not to replace the advice of your health care provider. Copyright   2003, 2019 [facility]. All rights reserved. Clinically reviewed by Nancy Santos MD. [employer] [id number] - REV 07/21.

## 2022-05-26 ENCOUNTER — HOSPITAL ENCOUNTER (EMERGENCY)
Facility: CLINIC | Age: 42
Discharge: HOME | End: 2022-05-26
Attending: PHYSICIAN ASSISTANT | Admitting: PHYSICIAN ASSISTANT
Payer: MEDICAID

## 2022-05-26 VITALS
DIASTOLIC BLOOD PRESSURE: 112 MMHG | SYSTOLIC BLOOD PRESSURE: 143 MMHG | HEART RATE: 89 BPM | OXYGEN SATURATION: 100 % | RESPIRATION RATE: 18 BRPM | TEMPERATURE: 98.1 F | BODY MASS INDEX: 19.8 KG/M2 | WEIGHT: 142 LBS

## 2022-05-26 DIAGNOSIS — L03.113 CELLULITIS OF RIGHT HAND: ICD-10-CM

## 2022-05-26 DIAGNOSIS — Z86.14 H/O METHICILLIN RESISTANT STAPHYLOCOCCUS AUREUS INFECTION: ICD-10-CM

## 2022-05-26 LAB
ANION GAP SERPL CALCULATED.3IONS-SCNC: 3 MMOL/L (ref 3–14)
BASOPHILS # BLD AUTO: 0.1 10E3/UL (ref 0–0.2)
BASOPHILS NFR BLD AUTO: 1 %
BUN SERPL-MCNC: 26 MG/DL (ref 7–30)
CALCIUM SERPL-MCNC: 9.2 MG/DL (ref 8.5–10.1)
CHLORIDE BLD-SCNC: 104 MMOL/L (ref 94–109)
CO2 SERPL-SCNC: 30 MMOL/L (ref 20–32)
CREAT SERPL-MCNC: 0.98 MG/DL (ref 0.66–1.25)
EOSINOPHIL # BLD AUTO: 0.1 10E3/UL (ref 0–0.7)
EOSINOPHIL NFR BLD AUTO: 1 %
ERYTHROCYTE [DISTWIDTH] IN BLOOD BY AUTOMATED COUNT: 13.2 % (ref 10–15)
GFR SERPL CREATININE-BSD FRML MDRD: >90 ML/MIN/1.73M2
GLUCOSE BLD-MCNC: 106 MG/DL (ref 70–99)
HCT VFR BLD AUTO: 41.7 % (ref 40–53)
HGB BLD-MCNC: 14.3 G/DL (ref 13.3–17.7)
IMM GRANULOCYTES # BLD: 0 10E3/UL
IMM GRANULOCYTES NFR BLD: 0 %
LACTATE SERPL-SCNC: 0.6 MMOL/L (ref 0.7–2)
LYMPHOCYTES # BLD AUTO: 2 10E3/UL (ref 0.8–5.3)
LYMPHOCYTES NFR BLD AUTO: 18 %
MCH RBC QN AUTO: 31.4 PG (ref 26.5–33)
MCHC RBC AUTO-ENTMCNC: 34.3 G/DL (ref 31.5–36.5)
MCV RBC AUTO: 92 FL (ref 78–100)
MONOCYTES # BLD AUTO: 1 10E3/UL (ref 0–1.3)
MONOCYTES NFR BLD AUTO: 9 %
NEUTROPHILS # BLD AUTO: 8 10E3/UL (ref 1.6–8.3)
NEUTROPHILS NFR BLD AUTO: 71 %
NRBC # BLD AUTO: 0 10E3/UL
NRBC BLD AUTO-RTO: 0 /100
PLATELET # BLD AUTO: 284 10E3/UL (ref 150–450)
POTASSIUM BLD-SCNC: 4.2 MMOL/L (ref 3.4–5.3)
RBC # BLD AUTO: 4.55 10E6/UL (ref 4.4–5.9)
SODIUM SERPL-SCNC: 137 MMOL/L (ref 133–144)
WBC # BLD AUTO: 11.3 10E3/UL (ref 4–11)

## 2022-05-26 PROCEDURE — 87205 SMEAR GRAM STAIN: CPT | Performed by: PHYSICIAN ASSISTANT

## 2022-05-26 PROCEDURE — 80048 BASIC METABOLIC PNL TOTAL CA: CPT | Performed by: PHYSICIAN ASSISTANT

## 2022-05-26 PROCEDURE — 250N000011 HC RX IP 250 OP 636: Performed by: PHYSICIAN ASSISTANT

## 2022-05-26 PROCEDURE — 99284 EMERGENCY DEPT VISIT MOD MDM: CPT | Performed by: PHYSICIAN ASSISTANT

## 2022-05-26 PROCEDURE — 258N000003 HC RX IP 258 OP 636: Performed by: PHYSICIAN ASSISTANT

## 2022-05-26 PROCEDURE — 83605 ASSAY OF LACTIC ACID: CPT | Performed by: PHYSICIAN ASSISTANT

## 2022-05-26 PROCEDURE — 99284 EMERGENCY DEPT VISIT MOD MDM: CPT | Mod: 25 | Performed by: PHYSICIAN ASSISTANT

## 2022-05-26 PROCEDURE — 36415 COLL VENOUS BLD VENIPUNCTURE: CPT | Performed by: PHYSICIAN ASSISTANT

## 2022-05-26 PROCEDURE — 87077 CULTURE AEROBIC IDENTIFY: CPT | Performed by: PHYSICIAN ASSISTANT

## 2022-05-26 PROCEDURE — 85025 COMPLETE CBC W/AUTO DIFF WBC: CPT | Performed by: PHYSICIAN ASSISTANT

## 2022-05-26 PROCEDURE — 96365 THER/PROPH/DIAG IV INF INIT: CPT | Performed by: PHYSICIAN ASSISTANT

## 2022-05-26 RX ORDER — SODIUM CHLORIDE 9 MG/ML
INJECTION, SOLUTION INTRAVENOUS CONTINUOUS
Status: DISCONTINUED | OUTPATIENT
Start: 2022-05-26 | End: 2022-05-27 | Stop reason: HOSPADM

## 2022-05-26 RX ORDER — CLINDAMYCIN HCL 300 MG
300 CAPSULE ORAL 4 TIMES DAILY
Qty: 40 CAPSULE | Refills: 0 | Status: SHIPPED | OUTPATIENT
Start: 2022-05-26 | End: 2022-06-05

## 2022-05-26 RX ORDER — CLINDAMYCIN PHOSPHATE 900 MG/50ML
900 INJECTION, SOLUTION INTRAVENOUS ONCE
Status: COMPLETED | OUTPATIENT
Start: 2022-05-26 | End: 2022-05-26

## 2022-05-26 RX ADMIN — CLINDAMYCIN PHOSPHATE 900 MG: 900 INJECTION, SOLUTION INTRAVENOUS at 21:55

## 2022-05-26 RX ADMIN — SODIUM CHLORIDE 250 ML: 9 INJECTION, SOLUTION INTRAVENOUS at 21:47

## 2022-05-27 NOTE — DISCHARGE INSTRUCTIONS
It was a pleasure working with you today!  I hope your condition improves rapidly!     Please apply heat to your hand for 20 minutes every 1-2 hours in the next 2 days.  Limit the use of your right hand until symptoms are improving.  Go to the pharmacy right away tomorrow morning to get your antibiotics and start them immediately.  Return to the ED if you develop fever, chills, streaking redness up your hand, or any worsening symptoms.

## 2022-05-27 NOTE — ED PROVIDER NOTES
History     Chief Complaint   Patient presents with     Wound Check     HPI  Edinson Cheung is a 41 year old male who presents for evaluation of right hand redness, swelling, pain, and clear drainage expressing from an opening occasionally.  No injury that he can recall.  He does have a history of MRSA.  Denies fever, chills, nausea, or vomiting.  He is scheduled to have resection of the distal tips of his third and fourth fingers on the left hand from an injury about a year ago.  He does not have any active lesions on his left hand.  Was recently treated for cellulitis on his left hand where a wound culture was performed and that did prove to be MRSA on 5/13/2022.  See below for details.  Denies any other rashes on his body.  Has not taken anything to treat his symptoms.          Excerpt from his most recent ED visit on 5/13/2022:    Assessments & Plan (with Medical Decision Making)   Edinson Cheung is a 41 year old male who presents with concerns for a painful lesion on the dorsum of his left nondominant hand.  Patient states while ago he cut the area on the thermostat while working on a car.  Since that time he has had some increased swelling, redness and increased pain.  No fever or chills.  Unfortunately about a year ago he injured the tip of his middle and ring finger in a lawnmower accident.  Currently is having ongoing issues and abnormal bony growth so is apparently scheduled to have the tips of both of those fingers amputated in the near future.  No other complaints or concerns today.  Review of record shows tetanus is up-to-date.  Daily smoker.  On exam patient did have a lesion on the dorsum of his hand that was opened and drained.  Culture is pending.  Keflex for 7 days.  Ibuprofen or Percocet for pain.  Follow-up if persistent symptoms after completion of antibiotics.  If worsening or new concerning symptoms return to the ER.  I have reviewed the nursing notes.     I have reviewed the  findings, diagnosis, plan and need for follow up with the patient.             New Prescriptions     CEPHALEXIN (KEFLEX) 500 MG CAPSULE    Take 1 capsule (500 mg) by mouth 4 times daily for 7 days     OXYCODONE-ACETAMINOPHEN (PERCOCET) 5-325 MG TABLET    Take 1 tablet by mouth every 6 hours as needed for pain         Final diagnoses:   Abscess, hand         5/13/2022   [facility] EMERGENCY DEPT     Florin Nick MD  05/13/22 1338         Result Notes    Culture 2+ Staphylococcus aureus MRSA Abnormal        3+ Streptococcus pyogenes (Group A Streptococcus) Abnormal     This organism is susceptible to ampicillin, penicillin, vancomycin and the cephalosporins. If treatment is required and your patient is allergic to penicillin, contact the microbiology lab within 5 days to request susceptibility testing.            Gram Stain Result   Abnormal   3+ Gram positive cocci in pairs and chains      3+ WBC seen   Predominantly PMNs           Resulting Agency: [facility]       Susceptibility     Staphylococcus aureus MRSA     JHONNY     Clindamycin Susceptible 1     Erythromycin Resistant     Gentamicin Susceptible     Linezolid Susceptible     Oxacillin Resistant 2     Tetracycline Susceptible     Trimethoprim/Sulfamethoxazole Susceptible     Vancomycin Susceptible              1 This isolate DOES NOT demonstrate inducible clindamycin resistance in vitro. Clindamycin is susceptible and could be used when indicated, however, erythromycin is resistant and should not be used.   2 Oxacillin susceptible isolates are susceptible to cephalosporins (example: cefazolin and cephalexin) and beta lactam combination agents. Oxacillin resistant isolates are resistant to these agents.        Susceptibility Comments    Staphylococcus aureus MRSA   MRSA requires contact precautions.                            Allergies:  Allergies   Allergen Reactions     No Known Allergies        Problem List:    There are no problems to display for  this patient.       Past Medical History:    No past medical history on file.    Past Surgical History:    Past Surgical History:   Procedure Laterality Date     EXCISE MASS FACE Left 6/28/2019    Procedure: Excision of left face mass;  Surgeon: Lang Rodriguez MD;  Location: [facility] OR       Family History:    No family history on file.    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        Medications:    clindamycin (CLEOCIN) 300 MG capsule  acetaminophen (TYLENOL) 500 MG tablet  albuterol (PROAIR HFA/PROVENTIL HFA/VENTOLIN HFA) 108 (90 Base) MCG/ACT inhaler  HYDROmorphone (DILAUDID) 2 MG tablet  ibuprofen (ADVIL/MOTRIN) 200 MG tablet  oxyCODONE (ROXICODONE) 5 MG tablet  oxyCODONE-acetaminophen (PERCOCET) 5-325 MG tablet          Review of Systems   All other systems reviewed and are negative.      Physical Exam   BP: (!) 152/109  Pulse: 94  Temp: 98.1  F (36.7  C)  Resp: 16  Weight: 64.4 kg (142 lb)  SpO2: 100 %      Physical Exam  Vitals and nursing note reviewed.   Constitutional:       General: He is not in acute distress.     Appearance: He is not diaphoretic.   HENT:      Head: Normocephalic and atraumatic.      Right Ear: External ear normal.      Left Ear: External ear normal.      Nose: Nose normal.      Mouth/Throat:      Pharynx: No oropharyngeal exudate.   Eyes:      General: No scleral icterus.        Right eye: No discharge.         Left eye: No discharge.      Conjunctiva/sclera: Conjunctivae normal.      Pupils: Pupils are equal, round, and reactive to light.   Neck:      Thyroid: No thyromegaly.   Cardiovascular:      Rate and Rhythm: Normal rate and regular rhythm.      Heart  sounds: Normal heart sounds. No murmur heard.  Pulmonary:      Effort: Pulmonary effort is normal. No respiratory distress.      Breath sounds: Normal breath sounds. No wheezing or rales.   Chest:      Chest wall: No tenderness.   Abdominal:      General: Bowel sounds are normal. There is no distension.      Palpations: Abdomen is soft. There is no mass.      Tenderness: There is no abdominal tenderness. There is no guarding or rebound.   Musculoskeletal:         General: No tenderness or deformity. Normal range of motion.      Cervical back: Normal range of motion and neck supple.      Comments: Normal range of motion of the wrist and MCP joints.  No restriction.  Distal pulses 2+.  Sensation intact light touch.   Lymphadenopathy:      Cervical: No cervical adenopathy.   Skin:     General: Skin is warm and dry.      Capillary Refill: Capillary refill takes less than 2 seconds.      Findings: Rash (3 units centimeter area of erythema with a pustular center.  Tenderness and swelling.  No streaking.) present. No erythema.   Neurological:      Mental Status: He is alert and oriented to person, place, and time.      Cranial Nerves: No cranial nerve deficit.   Psychiatric:         Behavior: Behavior normal.         Thought Content: Thought content normal.                     ED Course                 Procedures              Critical Care time:  none               Results for orders placed or performed during the hospital encounter of 05/26/22 (from the past 24 hour(s))   CBC with platelets differential    Narrative    The following orders were created for panel order CBC with platelets differential.  Procedure                               Abnormality         Status                     ---------                               -----------         ------                     CBC with platelets and d...[[id number]]  Abnormal            Final result                 Please view results for these tests on the individual orders.    Basic metabolic panel   Result Value Ref Range    Sodium 137 133 - 144 mmol/L    Potassium 4.2 3.4 - 5.3 mmol/L    Chloride 104 94 - 109 mmol/L    Carbon Dioxide (CO2) 30 20 - 32 mmol/L    Anion Gap 3 3 - 14 mmol/L    Urea Nitrogen 26 7 - 30 mg/dL    Creatinine 0.98 0.66 - 1.25 mg/dL    Calcium 9.2 8.5 - 10.1 mg/dL    Glucose 106 (H) 70 - 99 mg/dL    GFR Estimate >90 >60 mL/min/1.73m2   Lactic acid whole blood   Result Value Ref Range    Lactic Acid 0.6 (L) 0.7 - 2.0 mmol/L   CBC with platelets and differential   Result Value Ref Range    WBC Count 11.3 (H) 4.0 - 11.0 10e3/uL    RBC Count 4.55 4.40 - 5.90 10e6/uL    Hemoglobin 14.3 13.3 - 17.7 g/dL    Hematocrit 41.7 40.0 - 53.0 %    MCV 92 78 - 100 fL    MCH 31.4 26.5 - 33.0 pg    MCHC 34.3 31.5 - 36.5 g/dL    RDW 13.2 10.0 - 15.0 %    Platelet Count 284 150 - 450 10e3/uL    % Neutrophils 71 %    % Lymphocytes 18 %    % Monocytes 9 %    % Eosinophils 1 %    % Basophils 1 %    % Immature Granulocytes 0 %    NRBCs per 100 WBC 0 <1 /100    Absolute Neutrophils 8.0 1.6 - 8.3 10e3/uL    Absolute Lymphocytes 2.0 0.8 - 5.3 10e3/uL    Absolute Monocytes 1.0 0.0 - 1.3 10e3/uL    Absolute Eosinophils 0.1 0.0 - 0.7 10e3/uL    Absolute Basophils 0.1 0.0 - 0.2 10e3/uL    Absolute Immature Granulocytes 0.0 <=0.4 10e3/uL    Absolute NRBCs 0.0 10e3/uL       Medications   0.9% sodium chloride BOLUS (0 mLs Intravenous Stopped 5/26/22 2229)     Followed by   sodium chloride 0.9% infusion (has no administration in time range)   clindamycin (CLEOCIN) infusion 900 mg (0 mg Intravenous Stopped 5/26/22 2226)       Assessments & Plan (with Medical Decision Making)     Cellulitis of right hand  H/O methicillin resistant Staphylococcus aureus infection     41 year old male presents for evaluation of a rash that is painful, red, and swollen on the right hand.  No injury that he can recall.  Does have a history of MRSA in the left hand.  Improved with cephalexin treated 12 days prior.  No  current fever or chills.  On exam patient is afebrile with a temperature of 98.1.  He does have a swollen and red area with tenderness over the second MCP joint dorsally on the right hand.  No streaking.  I can express a very small amount of pus out of the center crust.  This was sent for culture.  IV was established.  Laboratory levels with a mild leukocytosis at 11,300.  Hemoglobin stable.  Lactic acid level normal at 0.6.  Basic metabolic panel within normal limits.  Patient was given a first dose of IV clindamycin given his MRSA history.  Discharged with outpatient clindamycin oral treatment.  Importance of resting the hand discussed.  Elevate is much as possible.  Heat to be applied to the hand for 20 minutes every couple hours for the next 2-3 days at a minimum.  Discussed that infection this close to the tendons can get severe very quickly if not appropriately treated.  Very strict ED return instructions reviewed with the patient.  He was in agreement with this plan and was suitable for discharge.     I have reviewed the nursing notes.    I have reviewed the findings, diagnosis, plan and need for follow up with the patient.       Discharge Medication List as of 5/26/2022 10:29 PM      START taking these medications    Details   clindamycin (CLEOCIN) 300 MG capsule Take 1 capsule (300 mg) by mouth 4 times daily for 10 days, Disp-40 capsule, R-0, E-Prescribe             Final diagnoses:   Cellulitis of right hand   H/O methicillin resistant Staphylococcus aureus infection     Disclaimer: This note consists of symbols derived from keyboarding, dictation and/or voice recognition software. As a result, there may be errors in the script that have gone undetected. Please consider this when interpreting information found in this chart.      5/26/2022   [facility] EMERGENCY DEPT     Jean Pierre Acevedo PA-C  05/27/22 0024

## 2022-05-27 NOTE — ED TRIAGE NOTES
Pt presents with wound to right knuckle. Purulent and appears like a boil. Pt did have another wound checked which was MRSA/strept positive. No known injury. Possible tick bite.     Triage Assessment     Row Name 05/26/22 2004       Triage Assessment (Adult)    Airway WDL WDL       Respiratory WDL    Respiratory WDL WDL       Skin Circulation/Temperature WDL    Skin Circulation/Temperature WDL X  Wound to right 1st knuckle. No known injury.        Cardiac WDL    Cardiac WDL WDL       Peripheral/Neurovascular WDL    Peripheral Neurovascular WDL WDL       Cognitive/Neuro/Behavioral WDL    Cognitive/Neuro/Behavioral WDL WDL

## 2022-05-30 ENCOUNTER — TELEPHONE (OUTPATIENT)
Dept: EMERGENCY MEDICINE | Facility: CLINIC | Age: 42
End: 2022-05-30
Payer: MEDICAID

## 2022-05-30 ENCOUNTER — MYC REFILL (OUTPATIENT)
Dept: FAMILY MEDICINE | Facility: CLINIC | Age: 42
End: 2022-05-30
Payer: MEDICAID

## 2022-05-30 DIAGNOSIS — Z20.822 SUSPECTED 2019 NOVEL CORONAVIRUS INFECTION: ICD-10-CM

## 2022-05-30 LAB
BACTERIA ABSC ANAEROBE+AEROBE CULT: ABNORMAL
BACTERIA ABSC ANAEROBE+AEROBE CULT: ABNORMAL
GRAM STAIN RESULT: ABNORMAL
GRAM STAIN RESULT: ABNORMAL

## 2022-05-30 NOTE — TELEPHONE ENCOUNTER
[facility]) Emergency Department Lab result notification [Adult-Male]    [place] ED lab result protocol used  Culture Protocol    Reason for call  Notify of lab results, assess symptoms,  review ED providers recommendations/discharge instructions (if necessary) and advise per ED lab result f/u protocol    Lab Result (including Rx patient on, if applicable)  Final Abscess Aerobic Bacterial Culture (specimen - Hand, Left; Abscess) report on 5/30/22  [facility] Emergency Dept discharge antibiotic prescribed: Clindamycin (Cleocin) 300 mg PO capsule, 1 capsule (300 mg) by mouth 4 times daily for 10 days   #1. Bacteria, Streptococcus pyogenes (Group A Streptococcus), susceptibilities not routinely done  #2. Bacteria, Staphylococcus aureus MRSA,  is [SUSCEPTIBLE to antibiotic.  Incision and Drainage performed in the [place] ED: NO  Recommendations in treatment per [facility] ED lab result culture protocol    Information table from Emergency Dept Provider visit on 5/26/22  Symptoms reported at ED visit (Chief complaint, HPI) Chief Complaint   Patient presents with     Wound Check      HPI  Edinson Cheung is a 41 year old male who presents for evaluation of right hand redness, swelling, pain, and clear drainage expressing from an opening occasionally.  No injury that he can recall.  He does have a history of MRSA.  Denies fever, chills, nausea, or vomiting.  He is scheduled to have resection of the distal tips of his third and fourth fingers on the left hand from an injury about a year ago.  He does not have any active lesions on his left hand.  Was recently treated for cellulitis on his left hand where a wound culture was performed and that did prove to be MRSA on 5/13/2022.  See below for details.  Denies any other rashes on his body.  Has not taken anything to treat his symptoms.     Significant Medical hx, if applicable (i.e. CKD, diabetes) Reviewed   Allergies Allergies   Allergen  Reactions     No Known Allergies       Weight, if applicable Wt Readings from Last 2 Encounters:   05/26/22 64.4 kg (142 lb)   05/25/22 64.4 kg (142 lb)      Coumadin/Warfarin [Yes /No] NO   Creatinine Level (mg/dl) Creatinine   Date Value Ref Range Status   05/26/2022 0.98 0.66 - 1.25 mg/dL Final      Creatinine clearance (ml/min), if applicable Serum creatinine: 0.98 mg/dL 05/26/22 2146  Estimated creatinine clearance: 88.5 mL/min   ED providers Impression and Plan (applicable information) Assessments & Plan (with Medical Decision Making)     Cellulitis of right hand  H/O methicillin resistant Staphylococcus aureus infection      41 year old male presents for evaluation of a rash that is painful, red, and swollen on the right hand.  No injury that he can recall.  Does have a history of MRSA in the left hand.  Improved with cephalexin treated 12 days prior.  No current fever or chills.  On exam patient is afebrile with a temperature of 98.1.  He does have a swollen and red area with tenderness over the second MCP joint dorsally on the right hand.  No streaking.  I can express a very small amount of pus out of the center crust.  This was sent for culture.  IV was established.  Laboratory levels with a mild leukocytosis at 11,300.  Hemoglobin stable.  Lactic acid level normal at 0.6.  Basic metabolic panel within normal limits.  Patient was given a first dose of IV clindamycin given his MRSA history.  Discharged with outpatient clindamycin oral treatment.  Importance of resting the hand discussed.  Elevate is much as possible.  Heat to be applied to the hand for 20 minutes every couple hours for the next 2-3 days at a minimum.  Discussed that infection this close to the tendons can get severe very quickly if not appropriately treated.  Very strict ED return instructions reviewed with the patient.  He was in agreement with this plan and was suitable for discharge.   ED diagnosis  Cellulitis of right hand  H/O  methicillin resistant Staphylococcus aureus infection   ED provider  Jean Pierre Acevedo PA-C RN Assessment (Patient s current Symptoms), include time called.   12:32 PM Left voicemail message requesting a call back to [facility] ED Lab Result RN at [phone].  RN is available every day between 9 a.m. and 5:30 p.m.      Alber Thomas RN  [facility]  Emergency Dept Lab Result RN  Ph# [phone]     Copy of Lab result  Abscess Aerobic Bacterial Culture Routine with Gram Stain  Order: [id number]   Status: Final result     Visible to patient: Yes (not seen)    Specimen Information: Hand, Left; Abscess         5 Result Notes    Culture 1+ Streptococcus pyogenes (Group A Streptococcus) Abnormal     Susceptibilities not routinely done   1+ Staphylococcus aureus MRSA Abnormal                  Gram Stain Result   Abnormal   2+ Intracellular gram positive cocci      3+ WBC seen   Predominantly PMNs           Resulting Agency: [facility]       Susceptibility      Staphylococcus aureus MRSA (2)    Antibiotic Interpretation Sensitivity  Method Status    Oxacillin Resistant >=4.0 ug/mL JHONNY Final     Oxacillin susceptible isolates are susceptible to cephalosporins (example: cefazolin and cephalexin) and beta lactam combination agents. Oxacillin resistant isolates are resistant to these agents.       Gentamicin Susceptible <=0.5 ug/mL JHONNY Final    Erythromycin Resistant >=8.0 ug/mL JHONNY Final    Clindamycin Susceptible <=0.25 ug/mL JHONNY Final     This isolate DOES NOT demonstrate inducible clindamycin resistance in vitro. Clindamycin is susceptible and could be used when indicated, however, erythromycin is resistant and should not be used.       Linezolid Susceptible 2.0 ug/mL JHONNY Final    Vancomycin Susceptible <=0.5 ug/mL JHONNY Final    Tetracycline Susceptible <=1.0 ug/mL JHONNY Final    Trimethoprim/Sulfamethoxazole Susceptible <=0.5/9.5 ug/mL JHONNY Final      Susceptibility  Comments    MRSA requires contact precautions.         Specimen Collected: 05/26/22 10:36 PM Last Resulted: 05/30/22  8:20 AM

## 2022-05-30 NOTE — TELEPHONE ENCOUNTER
[facility] () Emergency Department/Urgent Care Lab result notification     Patient/parent Name  Jered - patient    RN Assessment (Patient s current Symptoms), include time called.   Patient gave return call and left voice message on ED result line - 5:25 PM Left voicemail message requesting a call back to [facility] ED Lab Result RN at [phone].  RN is available every day between 9 a.m. and 5:30 p.m.      Lab result (if applicable):  Final Abscess Aerobic Bacterial Culture (specimen - Hand, Left; Abscess) report on 5/30/22  [facility] Emergency Dept discharge antibiotic prescribed: Clindamycin (Cleocin) 300 mg PO capsule, 1 capsule (300 mg) by mouth 4 times daily for 10 days   #1. Bacteria, Streptococcus pyogenes (Group A Streptococcus), susceptibilities not routinely done  #2. Bacteria, Staphylococcus aureus MRSA,  is [SUSCEPTIBLE to antibiotic.  Incision and Drainage performed in the [place] ED: NO  Recommendations in treatment per [facility] ED lab result culture protocol    Alber Thomas RN  [email or web address]  Emergency Dept Lab Result RN  # [phone]

## 2022-05-31 RX ORDER — ALBUTEROL SULFATE 90 UG/1
2 AEROSOL, METERED RESPIRATORY (INHALATION) EVERY 6 HOURS
Qty: 18 G | Refills: 0 | Status: SHIPPED | OUTPATIENT
Start: 2022-05-31

## 2022-05-31 NOTE — TELEPHONE ENCOUNTER
"Last Written Prescription Date:  11/29/21  Last Fill Quantity: 18,  # refills: 0   Last office visit provider:  5/25/22     Requested Prescriptions   Pending Prescriptions Disp Refills     albuterol (PROAIR HFA/PROVENTIL HFA/VENTOLIN HFA) 108 (90 Base) MCG/ACT inhaler 18 g 0     Sig: Inhale 2 puffs into the lungs every 6 hours       Asthma Maintenance Inhalers - Anticholinergics Passed - 5/30/2022  4:58 PM        Passed - Patient is age 12 years or older        Passed - Recent (12 mo) or future (30 days) visit within the authorizing provider's specialty     Patient has had an office visit with the authorizing provider or a provider within the authorizing providers department within the previous 12 mos or has a future within next 30 days. See \"Patient Info\" tab in inbasket, or \"Choose Columns\" in Meds & Orders section of the refill encounter.              Passed - Medication is active on med list       Short-Acting Beta Agonist Inhalers Protocol  Passed - 5/30/2022  4:58 PM        Passed - Patient is age 12 or older        Passed - Recent (12 mo) or future (30 days) visit within the authorizing provider's specialty     Patient has had an office visit with the authorizing provider or a provider within the authorizing providers department within the previous 12 mos or has a future within next 30 days. See \"Patient Info\" tab in inbasket, or \"Choose Columns\" in Meds & Orders section of the refill encounter.              Passed - Medication is active on med list             Krupa Franco RN 05/31/22 11:18 AM  "

## 2022-05-31 NOTE — TELEPHONE ENCOUNTER
[facility] () Emergency Department/Urgent Care Lab result notification     Patient/parent Name  Edinson - patient    RN Assessment (Patient s current Symptoms), include time called.    1:25 PM - Left voicemail message requesting a call back to [facility] ED Lab Result RN at [phone].  RN is available every day between 9 a.m. and 5:30 p.m.      Lab result (if applicable):  Final Abscess Aerobic Bacterial Culture (specimen - Hand, Left; Abscess) report on 5/30/22  [facility] Emergency Dept discharge antibiotic prescribed: Clindamycin (Cleocin) 300 mg PO capsule, 1 capsule (300 mg) by mouth 4 times daily for 10 days   #1. Bacteria, Streptococcus pyogenes (Group A Streptococcus), susceptibilities not routinely done  #2. Bacteria, Staphylococcus aureus MRSA,  is [SUSCEPTIBLE to antibiotic.  Incision and Drainage performed in the [place] ED: NO  Recommendations in treatment per [facility] ED lab result culture protocol        Alber Thomas RN  [facility]  Emergency Dept Lab Result RN  # [phone]

## 2022-05-31 NOTE — TELEPHONE ENCOUNTER
"[facility] Emergency Department/Urgent Care Lab result notification     Patient/parent Name  Jered    RN Assessment (Patient s current Symptoms), include time called.   2:03 PM - feels like its getting better but continues with some sometimes and some new symptoms  Right hand:  Sore:  Bruising/sore - underneath wound [place] and down to wrist  Redness:  \"not really\"  Fever:  No  Drainage:  A little drainage -will turn yellow and then he bumps and it 'splatters all over\"  Left hand:  Starting to get red ring      Lab result (if applicable):  Final Abscess Aerobic Bacterial Culture (specimen - Hand, Left; Abscess) report on 5/30/22  [facility] Emergency Dept discharge antibiotic prescribed: Clindamycin (Cleocin) 300 mg PO capsule, 1 capsule (300 mg) by mouth 4 times daily for 10 days   #1. Bacteria, Streptococcus pyogenes (Group A Streptococcus), susceptibilities not routinely done  #2. Bacteria, Staphylococcus aureus MRSA,  is [SUSCEPTIBLE to antibiotic.  Incision and Drainage performed in the [place] ED: NO  Recommendations in treatment per [facility] ED lab result culture protocol    RN Recommendations/Instructions per [place] ED lab result protocol  Patient notified of lab result and treatment recommendations.  Reviewed MRSA precautions and sent information via "[employer].  Encouraged wound check in 24 hours - patient to contact [place] clinic - may return to ED if needed or for worsening.  Patient verbalized understanding.     Please Contact your PCP clinic or return to the Emergency department if your:    Symptoms return.    Symptoms do not improve after 3 days on antibiotic.    Symptoms do not resolve after completing antibiotic.    Symptoms worsen or other concerning symptom's.    PCP follow-up Questions asked: YES       Alber Thomas RN  [facility]  Emergency Dept Lab Result RN  Ph# [phone]         "

## 2022-06-07 DIAGNOSIS — Z11.52 ENCOUNTER FOR SCREENING FOR COVID-19: Primary | ICD-10-CM

## 2022-06-10 ENCOUNTER — LAB (OUTPATIENT)
Dept: LAB | Facility: CLINIC | Age: 42
End: 2022-06-10
Payer: MEDICAID

## 2022-06-10 DIAGNOSIS — Z11.52 ENCOUNTER FOR SCREENING FOR COVID-19: ICD-10-CM

## 2022-06-10 PROCEDURE — U0005 INFEC AGEN DETEC AMPLI PROBE: HCPCS

## 2022-06-10 PROCEDURE — U0003 INFECTIOUS AGENT DETECTION BY NUCLEIC ACID (DNA OR RNA); SEVERE ACUTE RESPIRATORY SYNDROME CORONAVIRUS 2 (SARS-COV-2) (CORONAVIRUS DISEASE [COVID-19]), AMPLIFIED PROBE TECHNIQUE, MAKING USE OF HIGH THROUGHPUT TECHNOLOGIES AS DESCRIBED BY CMS-2020-01-R: HCPCS

## 2022-06-11 LAB — SARS-COV-2 RNA RESP QL NAA+PROBE: NEGATIVE

## 2022-06-29 ENCOUNTER — HOSPITAL ENCOUNTER (EMERGENCY)
Facility: CLINIC | Age: 42
Discharge: LEFT BEFORE DISCHARGE | End: 2022-06-29
Attending: PHYSICIAN ASSISTANT
Payer: MEDICAID

## 2022-06-29 NOTE — ED NOTES
Got patient clinic float RN appt to remove stitches. Taking patient off ER triage board via registration.

## 2022-07-24 ENCOUNTER — HOSPITAL ENCOUNTER (EMERGENCY)
Facility: CLINIC | Age: 42
Discharge: HOME | End: 2022-07-24
Attending: EMERGENCY MEDICINE | Admitting: EMERGENCY MEDICINE
Payer: COMMERCIAL

## 2022-07-24 VITALS
HEART RATE: 89 BPM | WEIGHT: 140 LBS | RESPIRATION RATE: 16 BRPM | BODY MASS INDEX: 19.53 KG/M2 | TEMPERATURE: 98 F | OXYGEN SATURATION: 100 % | DIASTOLIC BLOOD PRESSURE: 78 MMHG | SYSTOLIC BLOOD PRESSURE: 118 MMHG

## 2022-07-24 DIAGNOSIS — Z48.02 VISIT FOR SUTURE REMOVAL: ICD-10-CM

## 2022-07-24 PROCEDURE — 99284 EMERGENCY DEPT VISIT MOD MDM: CPT | Performed by: EMERGENCY MEDICINE

## 2022-07-24 PROCEDURE — 99283 EMERGENCY DEPT VISIT LOW MDM: CPT | Performed by: EMERGENCY MEDICINE

## 2022-07-24 RX ORDER — CLINDAMYCIN HCL 300 MG
300 CAPSULE ORAL 4 TIMES DAILY
Qty: 40 CAPSULE | Refills: 0 | Status: SHIPPED | OUTPATIENT
Start: 2022-07-24 | End: 2022-08-03

## 2022-07-24 NOTE — DISCHARGE INSTRUCTIONS
12-13 stitches were removed, some of them had to be pulled out in pieces.  Hopefully all of the stitches were removed, but it is possible that there is still some embedded underneath the callused and healing wound.    If there is any retained suture material, your body will eventually expel it.  You may have suture material working its way out of the wound or out of the skin.  If you are able to pull it out that is fine    Started you on an antibiotic today to help prevent infection, especially since your fingers were so tender and a bit red.  Complete the course of antibiotics even if you begin to feel better    Keep your fingers bandaged.  It is okay if you wash with warm soap and water, but pat dry carefully.  Do not submerge and soak your hand and fingers in a tub, pool, bath, sink, lake    Should establish care with a primary provider to make sure that this heals well

## 2022-07-24 NOTE — ED TRIAGE NOTES
Presents for suture removal to 3rd and 4th digit on left hand -has had them in since June 14th. States he came to have them removed two weeks ago but had an unpleasant encounter with registration so he left. Sutures appear to be grown in the flesh.     Triage Assessment     Row Name 07/24/22 1036       Triage Assessment (Adult)    Airway WDL WDL

## 2022-07-24 NOTE — ED PROVIDER NOTES
"  History     Chief Complaint   Patient presents with     Suture Removal     HPI  Edinson Cheung is a 41 year old male who presents the emergency room for suture removal.  He states that approximately 1 month ago, he injured his third and fourth left fingers in an accident at home.  He had the tips crushed by a lawnmower.  He said that he had to have his fingers amputated and repaired, and this was done at [facility].  He says he does not remember what happened since they \"put me out for it\".  He was supposed to have his sutures removed after 10 days.  He did not do so.  He presented to this ER for suture removal, and said that instead of being seen in the ER that they found him a clinic appointment.  He went over to the clinic but was told that since the sutures were not placed here that they would need a specific order, and they did not remove the sutures at that time.  He left because he was frustrated with the treatment, or lack thereof.  He did not seek care between that last ED check-in and now.  He says that he wants the stitches out today.  Has not been running a fever.  He did not get antibiotics after his injury.    Allergies:  Allergies   Allergen Reactions     No Known Allergies        Problem List:    There are no problems to display for this patient.       Past Medical History:    No past medical history on file.    Past Surgical History:    Past Surgical History:   Procedure Laterality Date     EXCISE MASS FACE Left 6/28/2019    Procedure: Excision of left face mass;  Surgeon: Lang Rodriguez MD;  Location: [facility] OR       Family History:    No family history on file.    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        Medications:    clindamycin (CLEOCIN) 300 MG capsule  acetaminophen (TYLENOL) 500 MG tablet  albuterol (PROAIR HFA/PROVENTIL HFA/VENTOLIN HFA) 108 (90 Base) MCG/ACT inhaler  HYDROmorphone (DILAUDID) 2 MG tablet  ibuprofen (ADVIL/MOTRIN) 200 MG tablet  oxyCODONE (ROXICODONE) 5 MG tablet  oxyCODONE-acetaminophen (PERCOCET) 5-325 MG tablet          Review of Systems   All other systems reviewed and are negative.      Physical Exam   BP: 118/78  Pulse: 78  Temp: 98  F (36.7  C)  Resp: 16  Weight: 63.5 kg (140 lb)  SpO2: 98 %      Physical Exam  Vitals and nursing note reviewed.   Constitutional:       General: He is not in acute distress.     Appearance: He is not diaphoretic.   HENT:      Head: Atraumatic.   Eyes:      General: No scleral icterus.     Pupils: Pupils are equal, round, and reactive to light.   Cardiovascular:      Pulses: Normal pulses.   Pulmonary:      Effort: Pulmonary effort is normal.   Musculoskeletal:         General: Tenderness present.      Comments: See photo below.  Amputation of the distal phalanx of third and fourth digit on the left hand.  Cracked and dried skin, callused.  Skin is overgrowing sutures.  No purulent drainage, tender to touch.  No streaking redness up finger or along tendon sheath, but do notice some redness just proximal to suture line third digit   Skin:     General: Skin is dry.      Capillary Refill: Capillary refill takes 2 to 3 seconds.   Neurological:      Mental Status: He is alert.                 ED [facility]    Suture Removal    Date/Time: 7/24/2022 11:09 AM  Performed by: Lexi Pérez DO  Authorized by: Lexi Pérez DO     Risks, benefits and alternatives discussed.      LOCATION     Location:  Upper extremity    Upper extremity location:  Hand    Hand location:  L long finger and L ring finger    PROCEDURE DETAILS     Wound  appearance:  Tender and red    Number of sutures removed:  12    POST-PROCEDURE DETAILS     Post-removal:  Antibiotic ointment applied and dressing applied                  Critical Care time:  none               No results found for this or any previous visit (from the past 24 hour(s)).    Medications - No data to display    Assessments & Plan (with Medical Decision Making)  Jered is a 41-year-old male presenting to the emergency room for suture removal.  Sutures were placed after traumatic amputation of his third and fourth left digit approximately 1 month ago.  Repaired at [facility].  Cannot view these records.  Patient did not seek care as directed, and when he came in later for suture removal in the ER was directed to clinic for this procedure but could not have it completed.  See history of focused physical exam as above  41-year-old male in no acute distress, vital signs are stable and he is afebrile.  See photo above.  He does have relatively good wound healing, but callused skin as well as overgrowing of the skin onto the suture material.  Attempted to remove sutures in the emergency room.  Was exquisitely tender for the patient, but most were removed without difficulty.  Still seem to have 1 or 2 sutures embedded underneath dry and cracked callused skin, this was soaked in warm water for a few minutes and then easily removed.  Covered with bacitracin and tube gauze.  We will start him on an oral antibiotic for possible underlying infection since he has had these sutures in for over a month and has not sought proper care.  Advised on appropriate wound care and recommended that he get established with a primary provider locally so he can have 1 available for follow-up.  Discharged in no distress     I have reviewed the nursing notes.    I have reviewed the findings, diagnosis, plan and need for follow up with the patient.       Discharge Medication List as of 7/24/2022 11:30 AM      START taking these  medications    Details   clindamycin (CLEOCIN) 300 MG capsule Take 1 capsule (300 mg) by mouth 4 times daily for 10 days, Disp-40 capsule, R-0, E-Prescribe             Final diagnoses:   Visit for suture removal       7/24/2022   [facility] EMERGENCY DEPT     Lexi Pérez,   07/24/22 3339

## 2022-10-09 ENCOUNTER — HEALTH MAINTENANCE LETTER (OUTPATIENT)
Age: 42
End: 2022-10-09

## 2022-12-26 NOTE — ED PROVIDER NOTES
History     Chief Complaint   Patient presents with     Hand Laceration     HPI  Edinson Cheung is a 40 year old male who presents to the ED this morning with a left thumb laceration.  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  He readjusted and cut again and it slipped and cut his left thumb.  He is right-hand dominant.  Up-to-date on his tetanus.  Just started this job last week.        Allergies:  No Known Allergies    Problem List:    There are no active problems to display for this patient.       Past Medical History:    No past medical history on file.    Past Surgical History:    Past Surgical History:   Procedure Laterality Date     EXCISE MASS FACE Left 6/28/2019    Procedure: Excision of left face mass;  Surgeon: Lang Rodriguez MD;  Location: [facility] OR       Family History:    No family history on file.    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        Medications:         acetaminophen (TYLENOL) 500 MG tablet       ibuprofen (ADVIL/MOTRIN) 200 MG tablet          Review of Systems   Skin: Positive for wound.   Neurological: Negative for weakness and numbness.       Physical Exam   BP: (!) 161/108  Pulse: 78  Temp: 97.2  F (36.2  C)  Resp: 18  Weight: 74.8 kg (165 lb)  SpO2: 99 %      Physical Exam  Constitutional:       General: He is not in acute distress.     Appearance: Normal appearance.   Pulmonary:      Effort: Pulmonary effort is normal. No respiratory distress.   Musculoskeletal:      Left hand: He exhibits laceration (left thumb). He exhibits normal range of motion ( is able to flex/ext but it is painful). Normal sensation noted.        Hands:    Neurological:      General: No focal deficit  present.      Mental Status: He is alert and oriented to person, place, and time.   Psychiatric:         Mood and Affect: Mood normal.         ED [facility]    -Laceration Repair    Date/Time: 12/7/2020 6:24 AM  Performed by: Demetrius Santos MD  Authorized by: Demetrius Santos MD       ANESTHESIA (see MAR for exact dosages):     Anesthesia method:  Nerve block    Block anesthetic:  Bupivacaine 0.5% w/o epi    Block technique:  Digital ring block  LACERATION DETAILS     Location:  Finger    Finger location:  L thumb    Length (cm):  2.8    REPAIR TYPE:     Repair type:  Simple      EXPLORATION:     Wound exploration: wound explored through full range of motion and entire depth of wound probed and visualized      Wound extent comment:  No tendon involvement identified    TREATMENT:     Area cleansed with:  Saline    Amount of cleaning:  Standard    Irrigation solution:  Sterile saline    Irrigation method:  Syringe    SKIN REPAIR     Repair method:  Sutures    Suture size:  5-0    Suture technique:  Horizontal mattress and running (2 H mattress and 8 simple running)    Number of sutures:  10    APPROXIMATION     Approximation:  Close    POST-PROCEDURE DETAILS     Dressing:  Antibiotic ointment and tube gauze      PROCEDURE   Patient Tolerance:  Patient tolerated the procedure well with no immediate complications                     Critical Care time:  none               No results found for this or any previous visit (from the past 24 hour(s)).    Medications - No data to display    Assessments & Plan (with Medical Decision Making)    40-year-old sustained a laceration of his left thumb at work this morning.  He is able to flex and extend although it is painful.  I was not able to identify any tendon involvement and the laceration is off to the side.  It was cleaned and repaired with horizontal mattress and simple running sutures.  He will follow-up in  clinic in 10 to 14 days for suture removal and reevaluation.  If he has any deficits, will need to see hand surgery.  He is up-to-date on his tetanus.  Report of Work Ability Form was completed.  Copy sent with him to give to his employer and one sent with him in his discharge instructions.  Verbal and written discharge instructions given.  He is comfortable with this plan.     I have reviewed the nursing notes.    I have reviewed the findings, diagnosis, plan and need for follow up with the patient.      REPORT OF WORK ABILITY    PATIENT DATA  Employee Name:   ELIZA CLEMENT       : 1980  #: [id number]      Work related injury: yes  Employer at time of injury: [employer]  Employer contact & phone: N/A  Employed elsewhere? no  Workers' Compensation Carrier/Managed Care Plan:  N/A    Today's date: 2020  Date of injury:2020  Date of first visit: 2020    PROVIDER EVALUATION: Please fill in as needed.  Please give copy to employee for employer.  1. Diagnosis: (S61.012A) Laceration of left thumb without foreign body without damage to nail, initial encounter  Comment: 2.8 cm  2. Treatment: suture repair  3. Medication: OTC Tylenol/Ibuprofen  NOTE: When ordering a medication, [place] Rules require [place] Comp or WC on prescriptions.  4.Return to work date: 2020   Restrictions:  Eliza Clement is able to return to work with restrictions:    No use of left thumb  Duration of restrictions:  Until rechecked in clinic.    Maximum Medical Improvement (Date): Deferred. To be determined at follow up visit in clinic.  Any Permanent Partial Disability? Deferred to future exam/consult.    Provider comments: All work comp injuries require a follow up clinic visit for reevaluation and eventual MMI (Maximum Medical Improvement) determination.  It is your responsibility to make your follow up appointment.  Any further work comp forms, disability evaluations, etc, will need to be done through  your clinic.    Medical Examiner:  Demetrius Key MD   License or registration: [place] [id number]  [facility]  [address]  [place] [address]  [phone] fax         Next appointment:  10-14 days for suture removal and MMI determination.  To be scheduled by patient.             Discharge Medication List as of 12/7/2020  7:01 AM      START taking these medications    Details   acetaminophen (TYLENOL) 500 MG tablet Take 2 tablets (1,000 mg) by mouth every 6 hours as needed for pain or fever, Disp-100 tablet, R-11, OTC      ibuprofen (ADVIL/MOTRIN) 200 MG tablet Take 3 tablets (600 mg) by mouth every 6 hours as needed for pain or fever, R-0, OTC             Final diagnoses:   Laceration of left thumb without foreign body without damage to nail, initial encounter - 2.8 cm       12/7/2020   [facility] EMERGENCY DEPT     Demetrius Santos MD  12/07/20 0761     feigenabum

## 2023-02-16 ENCOUNTER — HOSPITAL ENCOUNTER (EMERGENCY)
Facility: CLINIC | Age: 43
Discharge: HOME | End: 2023-02-16
Attending: PHYSICIAN ASSISTANT | Admitting: PHYSICIAN ASSISTANT
Payer: COMMERCIAL

## 2023-02-16 VITALS
OXYGEN SATURATION: 100 % | BODY MASS INDEX: 20.64 KG/M2 | WEIGHT: 148 LBS | DIASTOLIC BLOOD PRESSURE: 84 MMHG | SYSTOLIC BLOOD PRESSURE: 132 MMHG | HEART RATE: 84 BPM | RESPIRATION RATE: 18 BRPM | TEMPERATURE: 97.7 F

## 2023-02-16 DIAGNOSIS — S39.012A STRAIN OF LUMBAR REGION, INITIAL ENCOUNTER: ICD-10-CM

## 2023-02-16 PROCEDURE — 99284 EMERGENCY DEPT VISIT MOD MDM: CPT | Performed by: PHYSICIAN ASSISTANT

## 2023-02-16 PROCEDURE — 96372 THER/PROPH/DIAG INJ SC/IM: CPT | Performed by: PHYSICIAN ASSISTANT

## 2023-02-16 PROCEDURE — 99283 EMERGENCY DEPT VISIT LOW MDM: CPT | Performed by: PHYSICIAN ASSISTANT

## 2023-02-16 PROCEDURE — 250N000011 HC RX IP 250 OP 636: Performed by: PHYSICIAN ASSISTANT

## 2023-02-16 RX ORDER — ORPHENADRINE CITRATE 30 MG/ML
60 INJECTION INTRAMUSCULAR; INTRAVENOUS ONCE
Status: COMPLETED | OUTPATIENT
Start: 2023-02-16 | End: 2023-02-16

## 2023-02-16 RX ORDER — CYCLOBENZAPRINE HCL 10 MG
10 TABLET ORAL 3 TIMES DAILY PRN
Qty: 20 TABLET | Refills: 0 | Status: SHIPPED | OUTPATIENT
Start: 2023-02-16 | End: 2023-02-22

## 2023-02-16 RX ORDER — KETOROLAC TROMETHAMINE 30 MG/ML
30 INJECTION, SOLUTION INTRAMUSCULAR; INTRAVENOUS ONCE
Status: COMPLETED | OUTPATIENT
Start: 2023-02-16 | End: 2023-02-16

## 2023-02-16 RX ADMIN — ORPHENADRINE CITRATE 60 MG: 30 INJECTION INTRAMUSCULAR; INTRAVENOUS at 15:45

## 2023-02-16 RX ADMIN — KETOROLAC TROMETHAMINE 30 MG: 30 INJECTION, SOLUTION INTRAMUSCULAR at 15:45

## 2023-02-16 ASSESSMENT — ACTIVITIES OF DAILY LIVING (ADL): ADLS_ACUITY_SCORE: 33

## 2023-02-16 NOTE — DISCHARGE INSTRUCTIONS
Start taking ibuprofen 600 mg 3 times a day for pain control.  Use the muscle relaxer for spasm relief.  This can make you sleepy so do not take if you intend to drive or go to work.  Apply ice to the low back and practice gentle stretching.  If no improvement in a week follow-up with your clinic provider.  For any worsening symptoms please return to the emergency department.    Thank you for choosing [facility] Emergency Department. It was a pleasure taking care of you today. If you have any questions, please call [phone].    Chantal Soliz PA-C

## 2023-02-16 NOTE — ED PROVIDER NOTES
History     Chief Complaint   Patient presents with     Back Pain       HPI  Edinson Cheung is a 42 year old male who presents to the emergency department complaining of back pain.  Yesterday the patient was picking up some containers at his home and as he twisted right word he felt a pain in his right low back.  This pain has been constant since onset and worse with movement.  He took ibuprofen yesterday for pain but nothing today.  He denies any pain in the legs or weakness.  No numbness down the legs or in the groin.  No loss of bowel or bladder control.  No direct trauma to the back.      Allergies:  Allergies   Allergen Reactions     No Known Allergies        Problem List:    There are no problems to display for this patient.       Past Medical History:    History reviewed. No pertinent past medical history.    Past Surgical History:    Past Surgical History:   Procedure Laterality Date     EXCISE MASS FACE Left 6/28/2019    Procedure: Excision of left face mass;  Surgeon: Lang Rodriguez MD;  Location: [facility] OR       Family History:    History reviewed. No pertinent family history.    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        Medications:    cyclobenzaprine (FLEXERIL) 10 MG tablet  acetaminophen (TYLENOL) 500 MG tablet  albuterol (PROAIR HFA/PROVENTIL HFA/VENTOLIN HFA) 108 (90 Base) MCG/ACT inhaler  HYDROmorphone (DILAUDID) 2 MG tablet  ibuprofen (ADVIL/MOTRIN) 200 MG tablet  oxyCODONE (ROXICODONE) 5 MG tablet  oxyCODONE-acetaminophen (PERCOCET) 5-325 MG tablet          Review of Systems   All other systems reviewed and are negative.      Physical Exam   BP: (!) 142/91  Pulse: 88  Temp:  97.7  F (36.5  C)  Resp: 18  Weight: 67.1 kg (148 lb)  SpO2: 100 %      Physical Exam  Vitals and nursing note reviewed.   Constitutional:       General: He is not in acute distress.     Appearance: Normal appearance. He is not ill-appearing, toxic-appearing or diaphoretic.   HENT:      Head: Normocephalic and atraumatic.   Eyes:      Extraocular Movements: Extraocular movements intact.      Conjunctiva/sclera: Conjunctivae normal.   Pulmonary:      Effort: Pulmonary effort is normal. No respiratory distress.   Musculoskeletal:      Cervical back: Neck supple. No bony tenderness.      Thoracic back: No bony tenderness.      Lumbar back: Spasms (to area noted) and tenderness (to area noted) present. No bony tenderness. Negative right straight leg raise test and negative left straight leg raise test.        Back:    Neurological:      General: No focal deficit present.      Mental Status: He is alert and oriented to person, place, and time. Mental status is at baseline.      Gait: Gait normal.   Psychiatric:         Mood and Affect: Mood normal.         Behavior: Behavior normal.           ED Course           Procedures      No results found for this or any previous visit (from the past 24 hour(s)).    Medications   orphenadrine (NORFLEX) injection 60 mg (60 mg Intramuscular Given 2/16/23 1545)   ketorolac (TORADOL) injection 30 mg (30 mg Intramuscular Given 2/16/23 1545)         Assessments & Plan (with Medical Decision Making)  Edinson Cheung is a 42 year old male who presented to the ED complaining of right low back pain after twisting wrong yesterday while lifting something.  Denies any lower extremity symptoms such as pain or weakness.  No loss of bowel/bladder control, no saddle paresthesias.  He was mildly hypertensive on arrival but otherwise had normal vital signs.  Exam revealed focal tenderness with spasming to the right lumbar paraspinous musculature.  No midline back tenderness, negative straight leg  raise bilaterally.  Based on exam findings I do think pain is muscular in nature related to strain.  Without bony tenderness, no direct trauma, and negative straight leg raise, I do not think imaging is warranted.  Patient was given IM Toradol and Norflex here which she reported improved his pain.  Because of this, I recommended that he start taking ibuprofen more regularly for anti-inflammatory/pain relief.  I will also give him a prescription of Flexeril for muscle spasm relief.  He was encouraged to apply ice to the low back and practice gentle stretching.  Can follow-up as needed with his clinic provider.  Return precautions were provided.  All questions answered and patient discharged home in suitable condition.     I have reviewed the nursing notes.    I have reviewed the findings, diagnosis, plan and need for follow up with the patient.      New Prescriptions    CYCLOBENZAPRINE (FLEXERIL) 10 MG TABLET    Take 1 tablet (10 mg) by mouth 3 times daily as needed for muscle spasms       Final diagnoses:   Strain of lumbar region, initial encounter     Note: Chart documentation done in part with Dragon Voice Recognition software. Although reviewed after completion, some word and grammatical errors may remain.     2/16/2023   [facility] EMERGENCY DEPT     Chantal Soliz PA-C  02/16/23 7292

## 2023-02-16 NOTE — ED TRIAGE NOTES
Pt reports he lifted and twisted with a container at work and is now having low back pain, he feels like it is out of place, better if standing

## 2023-02-18 ENCOUNTER — HEALTH MAINTENANCE LETTER (OUTPATIENT)
Age: 43
End: 2023-02-18

## 2024-03-16 ENCOUNTER — HEALTH MAINTENANCE LETTER (OUTPATIENT)
Age: 44
End: 2024-03-16

## 2025-03-22 ENCOUNTER — HEALTH MAINTENANCE LETTER (OUTPATIENT)
Age: 45
End: 2025-03-22

## (undated) DEVICE — BLADE KNIFE SURG 15 371115

## (undated) DEVICE — DRSG STERI STRIP 1/4X3" R1541

## (undated) DEVICE — PREP CHLORAPREP 26ML TINTED ORANGE  260815

## (undated) DEVICE — SOL WATER IRRIG 1000ML BOTTLE 07139-09

## (undated) DEVICE — DRSG PRIMAPORE 02X3" 7133

## (undated) DEVICE — PACK MINOR PROCEDURE CUSTOM

## (undated) DEVICE — SU MONOCRYL 4-0 PS-2 18" UND Y496G

## (undated) DEVICE — ESU GROUND PAD UNIVERSAL W/O CORD

## (undated) DEVICE — GLOVE PROTEXIS W/NEU-THERA 7.5  2D73TE75

## (undated) DEVICE — SOL ADH LIQUID BENZOIN SWAB 0.6ML C1544

## (undated) DEVICE — DRSG STERI STRIP 1/2X4" R1547

## (undated) DEVICE — NDL ECLIPSE 25GA 1.5"

## (undated) DEVICE — SOL NACL 0.9% IRRIG 1000ML BOTTLE 07138-09

## (undated) DEVICE — SYR EAR BULB 2OZ

## (undated) DEVICE — DRSG PRIMAPORE 03 1/8X6" 66000318

## (undated) RX ORDER — BUPIVACAINE HYDROCHLORIDE AND EPINEPHRINE 2.5; 5 MG/ML; UG/ML
INJECTION, SOLUTION EPIDURAL; INFILTRATION; INTRACAUDAL; PERINEURAL
Status: DISPENSED
Start: 2019-06-28

## (undated) RX ORDER — LIDOCAINE HYDROCHLORIDE AND EPINEPHRINE 10; 10 MG/ML; UG/ML
INJECTION, SOLUTION INFILTRATION; PERINEURAL
Status: DISPENSED
Start: 2019-06-28

## (undated) RX ORDER — FENTANYL CITRATE 50 UG/ML
INJECTION, SOLUTION INTRAMUSCULAR; INTRAVENOUS
Status: DISPENSED
Start: 2019-06-28

## (undated) RX ORDER — ONDANSETRON 2 MG/ML
INJECTION INTRAMUSCULAR; INTRAVENOUS
Status: DISPENSED
Start: 2019-06-28